# Patient Record
Sex: FEMALE | URBAN - METROPOLITAN AREA
[De-identification: names, ages, dates, MRNs, and addresses within clinical notes are randomized per-mention and may not be internally consistent; named-entity substitution may affect disease eponyms.]

---

## 2022-10-18 ENCOUNTER — APPOINTMENT (OUTPATIENT)
Dept: URBAN - METROPOLITAN AREA CLINIC 256 | Age: 27
Setting detail: DERMATOLOGY
End: 2022-10-20

## 2022-10-18 DIAGNOSIS — L40.0 PSORIASIS VULGARIS: ICD-10-CM

## 2022-10-18 DIAGNOSIS — L40.59 OTHER PSORIATIC ARTHROPATHY: ICD-10-CM

## 2022-10-18 DIAGNOSIS — D22 MELANOCYTIC NEVI: ICD-10-CM

## 2022-10-18 PROBLEM — D22.39 MELANOCYTIC NEVI OF OTHER PARTS OF FACE: Status: ACTIVE | Noted: 2022-10-18

## 2022-10-18 PROBLEM — D22.5 MELANOCYTIC NEVI OF TRUNK: Status: ACTIVE | Noted: 2022-10-18

## 2022-10-18 PROCEDURE — OTHER COUNSELING: OTHER

## 2022-10-18 PROCEDURE — 99204 OFFICE O/P NEW MOD 45 MIN: CPT | Mod: 25

## 2022-10-18 PROCEDURE — OTHER MIPS QUALITY: OTHER

## 2022-10-18 PROCEDURE — 11901 INJECT SKIN LESIONS >7: CPT

## 2022-10-18 PROCEDURE — OTHER INTRALESIONAL KENALOG: OTHER

## 2022-10-18 PROCEDURE — OTHER PRESCRIPTION: OTHER

## 2022-10-18 PROCEDURE — OTHER SEPARATE AND IDENTIFIABLE DOCUMENTATION: OTHER

## 2022-10-18 RX ORDER — KETOCONAZOLE 20 MG/ML
SHAMPOO, SUSPENSION TOPICAL
Qty: 120 | Refills: 5 | Status: ERX | COMMUNITY
Start: 2022-10-18

## 2022-10-18 RX ORDER — CLOBETASOL PROPIONATE 0.5 MG/ML
SOLUTION TOPICAL
Qty: 50 | Refills: 3 | Status: ERX | COMMUNITY
Start: 2022-10-18

## 2022-10-18 ASSESSMENT — LOCATION DETAILED DESCRIPTION DERM
LOCATION DETAILED: RIGHT SUPERIOR PARIETAL SCALP
LOCATION DETAILED: LEFT MEDIAL FRONTAL SCALP
LOCATION DETAILED: RIGHT THIRD PROXIMAL INTERPHALANGEAL JOINT
LOCATION DETAILED: LEFT SUPERIOR MEDIAL MIDBACK
LOCATION DETAILED: LEFT FOURTH PROXIMAL INTERPHALANGEAL JOINT
LOCATION DETAILED: RIGHT FOURTH PROXIMAL INTERPHALANGEAL JOINT
LOCATION DETAILED: RIGHT MEDIAL FRONTAL SCALP
LOCATION DETAILED: LEFT FIFTH PROXIMAL INTERPHALANGEAL JOINT
LOCATION DETAILED: MID-FRONTAL SCALP
LOCATION DETAILED: LEFT THIRD PROXIMAL INTERPHALANGEAL JOINT
LOCATION DETAILED: NASAL DORSUM
LOCATION DETAILED: RIGHT INFERIOR OCCIPITAL SCALP
LOCATION DETAILED: RIGHT FIFTH DISTAL INTERPHALANGEAL JOINT
LOCATION DETAILED: HAIR
LOCATION DETAILED: MID-OCCIPITAL SCALP
LOCATION DETAILED: LEFT SECOND PROXIMAL INTERPHALANGEAL JOINT
LOCATION DETAILED: RIGHT OCCIPITAL SCALP
LOCATION DETAILED: RIGHT SECOND PROXIMAL INTERPHALANGEAL JOINT

## 2022-10-18 ASSESSMENT — LOCATION SIMPLE DESCRIPTION DERM
LOCATION SIMPLE: LEFT SECOND PIP
LOCATION SIMPLE: RIGHT SECOND PIP
LOCATION SIMPLE: POSTERIOR SCALP
LOCATION SIMPLE: LEFT FIFTH PIP
LOCATION SIMPLE: RIGHT THIRD PIP
LOCATION SIMPLE: RIGHT FIFTH DIP
LOCATION SIMPLE: LEFT LOWER BACK
LOCATION SIMPLE: RIGHT FOURTH PIP
LOCATION SIMPLE: SCALP
LOCATION SIMPLE: LEFT THIRD PIP
LOCATION SIMPLE: LEFT FOURTH PIP
LOCATION SIMPLE: LEFT SCALP
LOCATION SIMPLE: NOSE
LOCATION SIMPLE: RIGHT SCALP
LOCATION SIMPLE: ANTERIOR SCALP
LOCATION SIMPLE: HAIR

## 2022-10-18 ASSESSMENT — LOCATION ZONE DERM
LOCATION ZONE: NOSE
LOCATION ZONE: TRUNK
LOCATION ZONE: SCALP
LOCATION ZONE: FINGER

## 2022-10-18 ASSESSMENT — BSA PSORIASIS: % BODY COVERED IN PSORIASIS: 5

## 2022-10-18 NOTE — PROCEDURE: INTRALESIONAL KENALOG
Medical Necessity Clause: This procedure was medically necessary because the lesions that were treated were:\\n\\nLeft superior parietal scalp\\nLesions Injected: 1\\nMedication Injected: 5.0 mg/cc of Kenalog\\nTotal Volume Injected: .02cc\\nLot Number: [id number]\\nExpiration Date: 02/2024\\nNDC #: 4654-8989-66\\nAdministered by: Yudelka Oropeza Medical Necessity Clause: This procedure was medically necessary because the lesions that were treated were:\\n\\nLeft superior parietal scalp\\nLesions Injected: 1\\nMedication Injected: 5.0 mg/cc of Kenalog\\nTotal Volume Injected: .02cc\\nLot Number: [id number]\\nExpiration Date: 02/2024\\nNDC #: 9241-4542-99\\nAdministered by: Yudelka Oropeza

## 2023-03-17 ENCOUNTER — HOSPITAL ENCOUNTER (EMERGENCY)
Facility: CLINIC | Age: 28
Discharge: HOME | End: 2023-03-17
Attending: EMERGENCY MEDICINE | Admitting: EMERGENCY MEDICINE
Payer: COMMERCIAL

## 2023-03-17 ENCOUNTER — APPOINTMENT (OUTPATIENT)
Dept: ULTRASOUND IMAGING | Facility: CLINIC | Age: 28
End: 2023-03-17
Attending: EMERGENCY MEDICINE
Payer: COMMERCIAL

## 2023-03-17 ENCOUNTER — APPOINTMENT (OUTPATIENT)
Dept: CT IMAGING | Facility: CLINIC | Age: 28
End: 2023-03-17
Attending: EMERGENCY MEDICINE
Payer: COMMERCIAL

## 2023-03-17 VITALS
RESPIRATION RATE: 18 BRPM | OXYGEN SATURATION: 98 % | HEART RATE: 85 BPM | WEIGHT: 130 LBS | SYSTOLIC BLOOD PRESSURE: 123 MMHG | DIASTOLIC BLOOD PRESSURE: 73 MMHG | HEIGHT: 60 IN | TEMPERATURE: 99.7 F | BODY MASS INDEX: 25.52 KG/M2

## 2023-03-17 DIAGNOSIS — N83.202 CYST OF LEFT OVARY: ICD-10-CM

## 2023-03-17 DIAGNOSIS — N73.0 PID (ACUTE PELVIC INFLAMMATORY DISEASE): ICD-10-CM

## 2023-03-17 DIAGNOSIS — R10.84 ABDOMINAL PAIN, GENERALIZED: ICD-10-CM

## 2023-03-17 LAB
ALBUMIN SERPL BCG-MCNC: 5.1 G/DL (ref 3.5–5.2)
ALBUMIN UR-MCNC: NEGATIVE MG/DL
ALP SERPL-CCNC: 54 U/L (ref 35–104)
ALT SERPL W P-5'-P-CCNC: 12 U/L (ref 10–35)
ANION GAP SERPL CALCULATED.3IONS-SCNC: 11 MMOL/L (ref 7–15)
APPEARANCE UR: CLEAR
AST SERPL W P-5'-P-CCNC: 16 U/L (ref 10–35)
BACTERIA #/AREA URNS HPF: ABNORMAL /HPF
BASOPHILS # BLD AUTO: 0.1 10E3/UL (ref 0–0.2)
BASOPHILS NFR BLD AUTO: 1 %
BILIRUB SERPL-MCNC: 0.5 MG/DL
BILIRUB UR QL STRIP: NEGATIVE
BUN SERPL-MCNC: 10.7 MG/DL (ref 6–20)
CALCIUM SERPL-MCNC: 9.9 MG/DL (ref 8.6–10)
CHLORIDE SERPL-SCNC: 103 MMOL/L (ref 98–107)
COLOR UR AUTO: ABNORMAL
CREAT SERPL-MCNC: 0.64 MG/DL (ref 0.51–0.95)
DEPRECATED HCO3 PLAS-SCNC: 24 MMOL/L (ref 22–29)
DEPRECATED S PYO AG THROAT QL EIA: NEGATIVE
EOSINOPHIL # BLD AUTO: 0 10E3/UL (ref 0–0.7)
EOSINOPHIL NFR BLD AUTO: 1 %
ERYTHROCYTE [DISTWIDTH] IN BLOOD BY AUTOMATED COUNT: 13.1 % (ref 10–15)
GFR SERPL CREATININE-BSD FRML MDRD: >90 ML/MIN/1.73M2
GLUCOSE SERPL-MCNC: 100 MG/DL (ref 70–99)
GLUCOSE UR STRIP-MCNC: NEGATIVE MG/DL
GROUP A STREP BY PCR: NOT DETECTED
HCG SERPL QL: NEGATIVE
HCT VFR BLD AUTO: 42.6 % (ref 35–47)
HGB BLD-MCNC: 14 G/DL (ref 11.7–15.7)
HGB UR QL STRIP: NEGATIVE
HOLD SPECIMEN: 1
HOLD SPECIMEN: NORMAL
IMM GRANULOCYTES # BLD: 0 10E3/UL
IMM GRANULOCYTES NFR BLD: 1 %
KETONES UR STRIP-MCNC: ABNORMAL MG/DL
LEUKOCYTE ESTERASE UR QL STRIP: ABNORMAL
LIPASE SERPL-CCNC: 25 U/L (ref 13–60)
LYMPHOCYTES # BLD AUTO: 1.9 10E3/UL (ref 0.8–5.3)
LYMPHOCYTES NFR BLD AUTO: 32 %
MCH RBC QN AUTO: 29.9 PG (ref 26.5–33)
MCHC RBC AUTO-ENTMCNC: 32.9 G/DL (ref 31.5–36.5)
MCV RBC AUTO: 91 FL (ref 78–100)
MONOCYTES # BLD AUTO: 0.4 10E3/UL (ref 0–1.3)
MONOCYTES NFR BLD AUTO: 6 %
MUCOUS THREADS #/AREA URNS LPF: PRESENT /LPF
NEUTROPHILS # BLD AUTO: 3.7 10E3/UL (ref 1.6–8.3)
NEUTROPHILS NFR BLD AUTO: 59 %
NITRATE UR QL: NEGATIVE
NRBC # BLD AUTO: 0 10E3/UL
NRBC BLD AUTO-RTO: 0 /100
PH UR STRIP: 6 [PH] (ref 5–7)
PLATELET # BLD AUTO: 303 10E3/UL (ref 150–450)
POTASSIUM SERPL-SCNC: 4.3 MMOL/L (ref 3.4–5.3)
PROT SERPL-MCNC: 8 G/DL (ref 6.4–8.3)
RBC # BLD AUTO: 4.69 10E6/UL (ref 3.8–5.2)
RBC URINE: 3 /HPF
SODIUM SERPL-SCNC: 138 MMOL/L (ref 136–145)
SP GR UR STRIP: 1.02 (ref 1–1.03)
SQUAMOUS EPITHELIAL: 9 /HPF
UROBILINOGEN UR STRIP-MCNC: NORMAL MG/DL
WBC # BLD AUTO: 6.1 10E3/UL (ref 4–11)
WBC URINE: 2 /HPF

## 2023-03-17 PROCEDURE — 85004 AUTOMATED DIFF WBC COUNT: CPT | Performed by: EMERGENCY MEDICINE

## 2023-03-17 PROCEDURE — 84703 CHORIONIC GONADOTROPIN ASSAY: CPT | Performed by: EMERGENCY MEDICINE

## 2023-03-17 PROCEDURE — 76830 TRANSVAGINAL US NON-OB: CPT

## 2023-03-17 PROCEDURE — 250N000011 HC RX IP 250 OP 636: Performed by: EMERGENCY MEDICINE

## 2023-03-17 PROCEDURE — 80053 COMPREHEN METABOLIC PANEL: CPT | Performed by: EMERGENCY MEDICINE

## 2023-03-17 PROCEDURE — 258N000003 HC RX IP 258 OP 636: Performed by: EMERGENCY MEDICINE

## 2023-03-17 PROCEDURE — 96376 TX/PRO/DX INJ SAME DRUG ADON: CPT

## 2023-03-17 PROCEDURE — 96375 TX/PRO/DX INJ NEW DRUG ADDON: CPT

## 2023-03-17 PROCEDURE — 83690 ASSAY OF LIPASE: CPT | Performed by: EMERGENCY MEDICINE

## 2023-03-17 PROCEDURE — 96374 THER/PROPH/DIAG INJ IV PUSH: CPT

## 2023-03-17 PROCEDURE — 96372 THER/PROPH/DIAG INJ SC/IM: CPT | Mod: 59 | Performed by: EMERGENCY MEDICINE

## 2023-03-17 PROCEDURE — 87651 STREP A DNA AMP PROBE: CPT | Performed by: EMERGENCY MEDICINE

## 2023-03-17 PROCEDURE — 74177 CT ABD & PELVIS W/CONTRAST: CPT

## 2023-03-17 PROCEDURE — 250N000009 HC RX 250: Performed by: EMERGENCY MEDICINE

## 2023-03-17 PROCEDURE — 36415 COLL VENOUS BLD VENIPUNCTURE: CPT | Performed by: EMERGENCY MEDICINE

## 2023-03-17 PROCEDURE — 81001 URINALYSIS AUTO W/SCOPE: CPT | Performed by: EMERGENCY MEDICINE

## 2023-03-17 PROCEDURE — 96361 HYDRATE IV INFUSION ADD-ON: CPT

## 2023-03-17 PROCEDURE — 99285 EMERGENCY DEPT VISIT HI MDM: CPT | Mod: 25

## 2023-03-17 RX ORDER — AZITHROMYCIN 1 G/1
1 POWDER, FOR SUSPENSION ORAL ONCE
Qty: 1 EACH | Refills: 0 | Status: SHIPPED | OUTPATIENT
Start: 2023-03-17 | End: 2023-03-17

## 2023-03-17 RX ORDER — SODIUM CHLORIDE 9 MG/ML
INJECTION, SOLUTION INTRAVENOUS CONTINUOUS
Status: DISCONTINUED | OUTPATIENT
Start: 2023-03-17 | End: 2023-03-17 | Stop reason: HOSPADM

## 2023-03-17 RX ORDER — METRONIDAZOLE 500 MG/1
500 TABLET ORAL 2 TIMES DAILY
Qty: 14 TABLET | Refills: 1 | Status: SHIPPED | OUTPATIENT
Start: 2023-03-17 | End: 2023-03-31

## 2023-03-17 RX ORDER — ONDANSETRON 2 MG/ML
4 INJECTION INTRAMUSCULAR; INTRAVENOUS EVERY 30 MIN PRN
Status: DISCONTINUED | OUTPATIENT
Start: 2023-03-17 | End: 2023-03-17 | Stop reason: HOSPADM

## 2023-03-17 RX ORDER — KETOROLAC TROMETHAMINE 15 MG/ML
15 INJECTION, SOLUTION INTRAMUSCULAR; INTRAVENOUS ONCE
Status: COMPLETED | OUTPATIENT
Start: 2023-03-17 | End: 2023-03-17

## 2023-03-17 RX ORDER — ONDANSETRON 2 MG/ML
4 INJECTION INTRAMUSCULAR; INTRAVENOUS ONCE
Status: COMPLETED | OUTPATIENT
Start: 2023-03-17 | End: 2023-03-17

## 2023-03-17 RX ORDER — IOPAMIDOL 755 MG/ML
65 INJECTION, SOLUTION INTRAVASCULAR ONCE
Status: COMPLETED | OUTPATIENT
Start: 2023-03-17 | End: 2023-03-17

## 2023-03-17 RX ADMIN — LIDOCAINE HYDROCHLORIDE 500 MG: 10 INJECTION, SOLUTION EPIDURAL; INFILTRATION; INTRACAUDAL; PERINEURAL at 18:35

## 2023-03-17 RX ADMIN — KETOROLAC TROMETHAMINE 15 MG: 15 INJECTION, SOLUTION INTRAMUSCULAR; INTRAVENOUS at 14:17

## 2023-03-17 RX ADMIN — ONDANSETRON 4 MG: 2 INJECTION INTRAMUSCULAR; INTRAVENOUS at 13:52

## 2023-03-17 RX ADMIN — SODIUM CHLORIDE 60 ML: 9 INJECTION, SOLUTION INTRAVENOUS at 14:21

## 2023-03-17 RX ADMIN — IOPAMIDOL 65 ML: 755 INJECTION, SOLUTION INTRAVENOUS at 14:21

## 2023-03-17 RX ADMIN — ONDANSETRON 4 MG: 2 INJECTION INTRAMUSCULAR; INTRAVENOUS at 15:56

## 2023-03-17 RX ADMIN — SODIUM CHLORIDE 1000 ML: 9 INJECTION, SOLUTION INTRAVENOUS at 14:18

## 2023-03-17 ASSESSMENT — ACTIVITIES OF DAILY LIVING (ADL)
ADLS_ACUITY_SCORE: 35
ADLS_ACUITY_SCORE: 35

## 2023-03-17 ASSESSMENT — ENCOUNTER SYMPTOMS
DIARRHEA: 1
FEVER: 1
VOMITING: 1
NAUSEA: 1
ABDOMINAL PAIN: 1

## 2023-03-17 NOTE — ED NOTES
"Patient on call light, stating again aggressively to staff, \"that she is going to leave its been her 15 minutes, and has to leave now\" Staff told the patient that she had talked with the doctor when she was saying this prior and then decided to stay. Staff asked if she would like to talk with the doctor before she leaves and patient stated, \"if he wants to send me a prescription he can\", patient then stated, \"I want you to be out of my face\". Staff stated \"okay, I can leave\". RN notified. Patient coming out of her room talking with everyone around that she is leaving.   "

## 2023-03-17 NOTE — ED NOTES
States needs to leave, feels like her pain isn't being addressed because of her methadone hx, told pt MD will be notified and talk to her about results

## 2023-03-17 NOTE — ED TRIAGE NOTES
Abdominal pain that started Wednesday night. Pain is periumbilical. Was told by ObGyn on Wednesday that she has an umbilical hernia. Today reports nausea and a fever at home of 101. Temp came down to 99 with tylenol. Also some pain into the lower back. Notes cramping into the lower abdomen     Triage Assessment     Row Name 03/17/23 8102       Triage Assessment (Adult)    Airway WDL WDL       Respiratory WDL    Respiratory WDL WDL       Skin Circulation/Temperature WDL    Skin Circulation/Temperature WDL WDL       Cardiac WDL    Cardiac WDL WDL       Peripheral/Neurovascular WDL    Peripheral Neurovascular WDL WDL       Cognitive/Neuro/Behavioral WDL    Cognitive/Neuro/Behavioral WDL WDL

## 2023-03-17 NOTE — ED PROVIDER NOTES
History     Chief Complaint:  Abdominal Pain       The history is provided by the patient.      Sharon Barrera is a 28 year old female with a history of PID who presents with low abdominal pain. She reports about 3-4 months of intermittent cramping in her lower abdomen that radiates to her back. She went to see her ob/gyn a while ago, with pelvic exam and no problem. She went again 2 days ago and she was told she had a hernia. Her ob/gyn saw yellow discharge, which he thought was yeast but tests came back negative. The patient is sexually active, her recent STD tests came back negative. She reports a negative home pregnancy test. She notes history of PID, but her ob/gyn did not think this was the case. The patient reports that it seems like past PID since her cramping radiates to her back. She did not know she had a hernia, but it is now protruding after her recent appointment and there is pain in this area. Two nights ago she started to develop nausea, vomiting, and fever. This has persisted and is concerning to her today. She notes diarrhea as well. The patient mentions UTI a couple months ago, but this resolved with antibiotics. She is currently taking no medications. She had a past appendectomy, no other surgeries.     Independent Historian:   None - Patient Only    Review of External Notes: Urgent care note from today, recent ob/gyn office visit      ROS:  Review of Systems   Constitutional: Positive for fever.   Gastrointestinal: Positive for abdominal pain, diarrhea, nausea and vomiting.   Genitourinary: Positive for pelvic pain.   All other systems reviewed and are negative.      Allergies:  Doxycycline  Sertraline  Wellbutrin    Medications:    The patient is currently on no regular medications.     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  Recent ob/gyn appointment, has ultrasound scheduled next week.    PCP: Kenroy Diallo     Physical Exam     Patient Vitals for the past 24 hrs:   BP Temp Temp src Pulse Resp SpO2 Height Weight   03/17/23 1337 123/73 99.7  F (37.6  C) Temporal 85 18 98 % 1.524 m (5') 59 kg (130 lb)        Physical Exam  GENERAL: well developed, pleasant  HEAD: atraumatic  EYES: pupils reactive, extraocular muscles intact, conjunctivae normal  ENT:  mucus membranes moist  NECK:  trachea midline, normal range of motion  RESPIRATORY: no tachypnea, breath sounds clear to auscultation   CVS: normal S1/S2, no murmurs, intact distal pulses  ABDOMEN: soft, lower abdominal tenderness   MUSCULOSKELETAL: no deformities  SKIN: warm and dry, no acute rashes or ulceration  NEURO: GCS 15, cranial nerves intact, alert and oriented x3  PSYCH:  Mood/affect normal       Emergency Department Course     Imaging:  US Pelvis Cmplt w Transvag & Doppler LmtPel Duplex Limited   Preliminary Result   IMPRESSION:     1. There is a complex cystic mass seen in the left ovary measuring 3.1 x 3.0 x 2.7 cm with numerous areas of thickened irregular septations. No internal hypervascularity seen on color Doppler imaging. This may represent sequelae of a prior hemorrhagic    cyst. Conservative follow-up ultrasound in approximately 2 months for reevaluation is recommended. Otherwise the left ovary is normal with arterial and venous duplex flow identified.   2. Minimal free fluid in the pelvis.   3. The pelvis is otherwise normal.               CT Abdomen Pelvis w Contrast   Final Result   IMPRESSION:    1.  Left ovarian cystic lesion is noted measuring 3.5 cm. Small free   pelvic fluid.   2.  No other acute abnormality is seen.      DEBORAH RANDALL MD            SYSTEM ID:  [id number]      Report per radiology    Laboratory:  Labs Ordered and Resulted from Time of ED Arrival to Time of ED Departure   COMPREHENSIVE METABOLIC PANEL - Abnormal       Result Value    Sodium  138      Potassium 4.3      Chloride 103      Carbon Dioxide (CO2) 24      Anion Gap 11      Urea Nitrogen 10.7      Creatinine 0.64      Calcium 9.9      Glucose 100 (*)     Alkaline Phosphatase 54      AST 16      ALT 12      Protein Total 8.0      Albumin 5.1      Bilirubin Total 0.5      GFR Estimate >90     UA MACROSCOPIC WITH REFLEX TO MICRO AND CULTURE - Abnormal    Color Urine Light Yellow      Appearance Urine Clear      Glucose Urine Negative      Bilirubin Urine Negative      Ketones Urine Trace (*)     Specific Gravity Urine 1.020      Blood Urine Negative      pH Urine 6.0      Protein Albumin Urine Negative      Urobilinogen Urine Normal      Nitrite Urine Negative      Leukocyte Esterase Urine Trace (*)     Bacteria Urine Few (*)     Mucus Urine Present (*)     RBC Urine 3 (*)     WBC Urine 2      Squamous Epithelials Urine 9 (*)    LIPASE - Normal    Lipase 25     HCG QUALITATIVE PREGNANCY - Normal    hCG Serum Qualitative Negative     STREPTOCOCCUS A RAPID SCREEN W REFELX TO PCR - Normal    Group A Strep antigen Negative     CBC WITH PLATELETS AND DIFFERENTIAL    WBC Count 6.1      RBC Count 4.69      Hemoglobin 14.0      Hematocrit 42.6      MCV 91      MCH 29.9      MCHC 32.9      RDW 13.1      Platelet Count 303      % Neutrophils 59      % Lymphocytes 32      % Monocytes 6      % Eosinophils 1      % Basophils 1      % Immature Granulocytes 1      NRBCs per 100 WBC 0      Absolute Neutrophils 3.7      Absolute Lymphocytes 1.9      Absolute Monocytes 0.4      Absolute Eosinophils 0.0      Absolute Basophils 0.1      Absolute Immature Granulocytes 0.0      Absolute NRBCs 0.0     GROUP A STREPTOCOCCUS PCR THROAT SWAB        Emergency Department Course & Assessments:       Interventions:  Medications   0.9% sodium chloride BOLUS (0 mLs Intravenous Stopped 3/17/23 4788)     Followed by   sodium chloride 0.9% infusion (has no administration in time range)   ondansetron (ZOFRAN) injection 4 mg (4 mg  Intravenous $Given 3/17/23 1556)   ondansetron (ZOFRAN) injection 4 mg (4 mg Intravenous $Given 3/17/23 1352)   ketorolac (TORADOL) injection 15 mg (15 mg Intravenous $Given 3/17/23 1417)   iopamidol (ISOVUE-370) solution 65 mL (65 mLs Intravenous $Given 3/17/23 1421)   100mL Saline Flush (60 mLs Intravenous $Given 3/17/23 1421)   cefTRIAXone (ROCEPHIN) 500 mg in lidocaine injection (500 mg Intramuscular $Given 3/17/23 1835)        Assessments:  1401 I obtained history and examined the patient as noted above.   1710 I rechecked the patient and explained findings.   1737 I rechecked the patient after being informed she would like to go home. She agreed to stay for the ultrasound.   1845 I updated the patient. At this point I feel that the patient is safe for discharge, and the patient agrees.     Independent Interpretation (X-rays, CTs, rhythm strip):  na    Consultations/Discussion of Management or Tests:  None     Social Determinants of Health affecting care:   None    Disposition:  The patient was discharged to home.     Impression & Plan      Medical Decision Making:  Patient presents with lower abdominal pain that is been ongoing for some time.  She notes seeing her OB and doing a pelvic exam and noting an angry cervix with some discharge but her STD testing was normal.  She does have a history of PID.  She has pain in the lower belly that goes into her back.  She also has reports of fever and concerns for bowel obstruction or hernia complications as she was told that she had a hernia the other day.  Patient's CT shows cystic lesion in the left ovary otherwise no other acute findings.  Other labs are reassuring.  She has been anxious to leave.  Did finally convince her to get an ultrasound that shows complex cystic lesion but no suggestion of tubo-ovarian abscess.  Again she is quite anxious to leave she was given Rocephin given her history of PID and this reports of fever without findings on exam.  Have not  repeated the pelvic exam as it was just recently done.  We will go ahead and treat her empirically and have her follow-up otherwise return if worse.      Diagnosis:    ICD-10-CM    1. Abdominal pain, generalized  R10.84       2. Cyst of left ovary  N83.202       3. PID (acute pelvic inflammatory disease)  N73.0            Discharge Medications:  New Prescriptions    AZITHROMYCIN (ZITHROMAX) 1 G POWDER    Take 1 packet (1 g) by mouth once for 1 dose    METRONIDAZOLE (FLAGYL) 500 MG TABLET    Take 1 tablet (500 mg) by mouth 2 times daily for 14 days        Scribe Disclosure:  I, Gloria Richmond, am serving as a scribe at 2:00 PM on 3/17/2023 to document services personally performed by Calvin Newsome MD based on my observations and the provider's statements to me.     3/17/2023   Calvin Newsome MD Adams, Shaun L, MD  03/17/23 2046

## 2023-10-09 ENCOUNTER — APPOINTMENT (OUTPATIENT)
Dept: URBAN - METROPOLITAN AREA CLINIC 259 | Age: 28
Setting detail: DERMATOLOGY
End: 2023-10-11

## 2023-10-09 DIAGNOSIS — D49.2 NEOPLASM OF UNSPECIFIED BEHAVIOR OF BONE, SOFT TISSUE, AND SKIN: ICD-10-CM

## 2023-10-09 DIAGNOSIS — L40.0 PSORIASIS VULGARIS: ICD-10-CM

## 2023-10-09 PROCEDURE — 11102 TANGNTL BX SKIN SINGLE LES: CPT

## 2023-10-09 PROCEDURE — OTHER MIPS QUALITY: OTHER

## 2023-10-09 PROCEDURE — 99214 OFFICE O/P EST MOD 30 MIN: CPT | Mod: 25

## 2023-10-09 PROCEDURE — OTHER BIOPSY BY SHAVE METHOD: OTHER

## 2023-10-09 PROCEDURE — OTHER COUNSELING: OTHER

## 2023-10-09 PROCEDURE — 11103 TANGNTL BX SKIN EA SEP/ADDL: CPT

## 2023-10-09 PROCEDURE — OTHER PRESCRIPTION: OTHER

## 2023-10-09 RX ORDER — CLOBETASOL PROPIONATE 0.5 MG/ML
SOLUTION TOPICAL
Qty: 50 | Refills: 3 | Status: ERX

## 2023-10-09 RX ORDER — KETOCONAZOLE 20 MG/ML
SHAMPOO, SUSPENSION TOPICAL
Qty: 120 | Refills: 5 | Status: ERX

## 2023-10-09 ASSESSMENT — LOCATION SIMPLE DESCRIPTION DERM
LOCATION SIMPLE: POSTERIOR SCALP
LOCATION SIMPLE: NOSE
LOCATION SIMPLE: LEFT ANTERIOR NECK

## 2023-10-09 ASSESSMENT — LOCATION ZONE DERM
LOCATION ZONE: SCALP
LOCATION ZONE: NECK
LOCATION ZONE: NOSE

## 2023-10-09 ASSESSMENT — LOCATION DETAILED DESCRIPTION DERM
LOCATION DETAILED: LEFT SUPERIOR ANTERIOR NECK
LOCATION DETAILED: NASAL DORSUM
LOCATION DETAILED: RIGHT OCCIPITAL SCALP

## 2023-10-09 NOTE — HPI: SKIN LESION
What Type Of Note Output Would You Prefer (Optional)?: Standard Output
How Severe Is Your Skin Lesion?: mild
Is This A New Presentation, Or A Follow-Up?: Skin Lesion
Additional History: The patient reports a mole on her nose started growing a few years ago and then became painful and tender in the last few weeks. She states that the mole scabbed over a few days ago also began bleeding today. She reports a family history of melanoma. The patient reports that at age 20, she had a mole removed from her lip which was benign.\\n\\nOf note, she had a background history of psoriasis.

## 2023-10-09 NOTE — PROCEDURE: COUNSELING
Detail Level: Detailed
Patient Specific Counseling (Will Not Stick From Patient To Patient): ***We discussed that scalp is a difficult to treat area that could qualify for a biologic medication.  For now, we will continue Ketoconazole shampoo and topical steroid. She is planning to see a Rheumatologist.
Detail Level: Zone

## 2023-10-23 ENCOUNTER — TRANSFERRED RECORDS (OUTPATIENT)
Dept: HEALTH INFORMATION MANAGEMENT | Facility: CLINIC | Age: 28
End: 2023-10-23
Payer: COMMERCIAL

## 2023-11-15 NOTE — TELEPHONE ENCOUNTER
RECORDS RECEIVED FROM:    DATE RECEIVED:    NOTES STATUS DETAILS   OFFICE NOTE from referring provider  N/A    OFFICE NOTE from other cardiologists  Care Everywhere Dr. Guevara 11-14-23   RECORDS from hospital/ED Care Everywhere 10-23-23   MEDICATION LIST Internal    GENERAL CARDIO RECORDS   (ALL APPOINTMENT TYPES)     LABS (CBC,BMP,CMP, TSH) Internal    EKG (STRIPS & REPORTS) Care Everywhere 11-14-23 in [facility]   MONITORS (STRIPS & REPORTS) N/A    ECHOS (IMAGES AND REPORTS) In process 10-24-23 Requested   STRESS TESTS (IMAGES AND REPORTS) N/A    cMRI (IMAGES AND REPORTS) In process 10-25-23 Requested   CT/CTA (IMAGES AND REPORTS) In process 10-24-23 Requested     Action 11/15/23 [facility]  Fax #[phone]   Action Taken Requested:  EKG Strips  cMRI  CT Cardiac coronary  Echo  CT Chest PE 10-25-23, 10-24-23  10-25-23  10-25-23  10-24-23  10-24-23     Resolved all 4 images in PACS, sent EKGs to scanning 11/20

## 2023-11-17 ENCOUNTER — NURSE TRIAGE (OUTPATIENT)
Dept: CARDIOLOGY | Facility: CLINIC | Age: 28
End: 2023-11-17
Payer: COMMERCIAL

## 2023-11-17 NOTE — TELEPHONE ENCOUNTER
"Received a call from the call center who says patient has anew patient appointment, but wanted direction for when to go to ED.  Patient has an appointment 11/29/23 with Dr. Cash to establish care.  Spoke to Sharon, who says she was in the hospital recently and was seen by Shayla Guevara NP on 11/14/23 at the [facility] for follow up of chest pain and shortness of breath. She says the last 3 days, her symptoms seem to be intermittently worse, and is asking for recommendations.  Advised that she continue to follow up with her [facility] cardiology team for any questions or concerns until established with [facility] cardiology. She says she has and they \"don't know anything\" and tell her to go back to the hospital.  Advised if symptoms are worsening, it is recommended to go to ED for an evaluation.  She verbalized agreement and understanding.  "

## 2023-11-29 ENCOUNTER — HOSPITAL ENCOUNTER (OUTPATIENT)
Facility: CLINIC | Age: 28
End: 2023-11-29
Attending: INTERNAL MEDICINE | Admitting: INTERNAL MEDICINE
Payer: COMMERCIAL

## 2023-11-29 ENCOUNTER — OFFICE VISIT (OUTPATIENT)
Dept: CARDIOLOGY | Facility: CLINIC | Age: 28
End: 2023-11-29
Payer: COMMERCIAL

## 2023-11-29 VITALS
DIASTOLIC BLOOD PRESSURE: 81 MMHG | HEIGHT: 62 IN | HEART RATE: 81 BPM | OXYGEN SATURATION: 98 % | WEIGHT: 132.9 LBS | SYSTOLIC BLOOD PRESSURE: 119 MMHG | BODY MASS INDEX: 24.46 KG/M2

## 2023-11-29 DIAGNOSIS — R06.09 DYSPNEA ON EXERTION: Primary | ICD-10-CM

## 2023-11-29 DIAGNOSIS — R00.2 PALPITATIONS: ICD-10-CM

## 2023-11-29 DIAGNOSIS — R55 VASOVAGAL SYNCOPE: ICD-10-CM

## 2023-11-29 DIAGNOSIS — I49.1 PAC (PREMATURE ATRIAL CONTRACTION): ICD-10-CM

## 2023-11-29 DIAGNOSIS — R07.89 OTHER CHEST PAIN: ICD-10-CM

## 2023-11-29 DIAGNOSIS — I49.3 PVC'S (PREMATURE VENTRICULAR CONTRACTIONS): ICD-10-CM

## 2023-11-29 DIAGNOSIS — R06.09 DYSPNEA ON EXERTION: ICD-10-CM

## 2023-11-29 PROCEDURE — G0463 HOSPITAL OUTPT CLINIC VISIT: HCPCS | Performed by: INTERNAL MEDICINE

## 2023-11-29 PROCEDURE — 99204 OFFICE O/P NEW MOD 45 MIN: CPT | Performed by: INTERNAL MEDICINE

## 2023-11-29 RX ORDER — KETOCONAZOLE 20 MG/ML
SHAMPOO TOPICAL
COMMUNITY

## 2023-11-29 RX ORDER — METOPROLOL TARTRATE 25 MG/1
12.5 TABLET, FILM COATED ORAL
COMMUNITY
Start: 2023-10-25

## 2023-11-29 RX ORDER — LIDOCAINE 40 MG/G
CREAM TOPICAL
OUTPATIENT
Start: 2023-11-29

## 2023-11-29 RX ORDER — CLOBETASOL PROPIONATE 0.5 MG/ML
SOLUTION TOPICAL
COMMUNITY

## 2023-11-29 RX ORDER — IBUPROFEN 800 MG/1
TABLET, FILM COATED ORAL
COMMUNITY
Start: 2023-08-24

## 2023-11-29 ASSESSMENT — PAIN SCALES - GENERAL: PAINLEVEL: EXTREME PAIN (8)

## 2023-11-29 NOTE — PROGRESS NOTES
I am delighted to see Sharon Barrera in consultation.The primary encounter diagnosis was Vasovagal syncope. Diagnoses of Other chest pain, Palpitations, Dyspnea on exertion, and PVC's (premature ventricular contractions) were also pertinent to this visit.   As you know, the patient is a 28 year old  female. She   has a past medical history of Anxiety, Depressive disorder, Nondependent opioid abuse, continuous (H) (08/08/2016), Palpitations, Papanicolaou smear of cervix with low grade squamous intraepithelial lesion (LGSIL) (12/14/2016), PTSD (post-traumatic stress disorder), Shortness of breath, and Syncope..    On this visit, the patient states that she has continuous chest tightness, dyspnea, worsening chest pain when sitting compared to lying, palpitations.  The patient denies orthopnea and paroxysmal nocturnal dyspnea. She has intermittent bilat LE edema. She had syncope in the hospital related to pain.    The patient's cardiovascular risk factors include arrhythmia.    The following portions of the patient's history were reviewed and updated as appropriate: allergies, current medications, past family history, past medical history, past social history, past surgical history, and the problem list.    PMH: The patient's past medical history includes:    Past Medical History:   Diagnosis Date    Anxiety     Depressive disorder     post partum    Nondependent opioid abuse, continuous (H) 08/08/2016    Palpitations     Papanicolaou smear of cervix with low grade squamous intraepithelial lesion (LGSIL) 12/14/2016    PTSD (post-traumatic stress disorder)     [place] survivor    Shortness of breath     Syncope       Past Surgical History:   Procedure Laterality Date    APPENDECTOMY         The patient's medications as of the current encounter are:     Current Outpatient Medications   Medication Sig Dispense Refill    clobetasol (TEMOVATE) 0.05 % external solution Apply topically every 48 hours       ibuprofen (ADVIL/MOTRIN) 800 MG tablet take 1 tablet by mouth every 6 to 8 hours as needed for pain      ketoconazole (NIZORAL) 2 % external shampoo Apply topically every 48 hours      metoprolol tartrate (LOPRESSOR) 25 MG tablet Take 12.5 mg by mouth         Labs:     Admission on 03/17/2023, Discharged on 03/17/2023   Component Date Value Ref Range Status    Sodium 03/17/2023 138  136 - 145 mmol/L Final    Potassium 03/17/2023 4.3  3.4 - 5.3 mmol/L Final    Chloride 03/17/2023 103  98 - 107 mmol/L Final    Carbon Dioxide (CO2) 03/17/2023 24  22 - 29 mmol/L Final    Anion Gap 03/17/2023 11  7 - 15 mmol/L Final    Urea Nitrogen 03/17/2023 10.7  6.0 - 20.0 mg/dL Final    Creatinine 03/17/2023 0.64  0.51 - 0.95 mg/dL Final    Calcium 03/17/2023 9.9  8.6 - 10.0 mg/dL Final    Glucose 03/17/2023 100 (H)  70 - 99 mg/dL Final    Alkaline Phosphatase 03/17/2023 54  35 - 104 U/L Final    AST 03/17/2023 16  10 - 35 U/L Final    ALT 03/17/2023 12  10 - 35 U/L Final    Protein Total 03/17/2023 8.0  6.4 - 8.3 g/dL Final    Albumin 03/17/2023 5.1  3.5 - 5.2 g/dL Final    Bilirubin Total 03/17/2023 0.5  <=1.2 mg/dL Final    GFR Estimate 03/17/2023 >90  >60 mL/min/1.73m2 Final    eGFR calculated using 2021 CKD-EPI equation.    Lipase 03/17/2023 25  13 - 60 U/L Final    hCG Serum Qualitative 03/17/2023 Negative  Negative Final    This test is for screening purposes.  Results should be interpreted along with the clinical picture.  Confirmation testing is available if warranted by ordering [id number], HCG Quantitative Pregnancy.    WBC Count 03/17/2023 6.1  4.0 - 11.0 10e3/uL Final    RBC Count 03/17/2023 4.69  3.80 - 5.20 10e6/uL Final    Hemoglobin 03/17/2023 14.0  11.7 - 15.7 g/dL Final    Hematocrit 03/17/2023 42.6  35.0 - 47.0 % Final    MCV 03/17/2023 91  78 - 100 fL Final    MCH 03/17/2023 29.9  26.5 - 33.0 pg Final    MCHC 03/17/2023 32.9  31.5 - 36.5 g/dL Final    RDW 03/17/2023 13.1  10.0 - 15.0 % Final    Platelet Count  03/17/2023 303  150 - 450 10e3/uL Final    % Neutrophils 03/17/2023 59  % Final    % Lymphocytes 03/17/2023 32  % Final    % Monocytes 03/17/2023 6  % Final    % Eosinophils 03/17/2023 1  % Final    % Basophils 03/17/2023 1  % Final    % Immature Granulocytes 03/17/2023 1  % Final    NRBCs per 100 WBC 03/17/2023 0  <1 /100 Final    Absolute Neutrophils 03/17/2023 3.7  1.6 - 8.3 10e3/uL Final    Absolute Lymphocytes 03/17/2023 1.9  0.8 - 5.3 10e3/uL Final    Absolute Monocytes 03/17/2023 0.4  0.0 - 1.3 10e3/uL Final    Absolute Eosinophils 03/17/2023 0.0  0.0 - 0.7 10e3/uL Final    Absolute Basophils 03/17/2023 0.1  0.0 - 0.2 10e3/uL Final    Absolute Immature Granulocytes 03/17/2023 0.0  <=0.4 10e3/uL Final    Absolute NRBCs 03/17/2023 0.0  10e3/uL Final    Hold Specimen 03/17/2023 [facility]   Final    Hold Specimen 03/17/2023 1   Final    Color Urine 03/17/2023 Light Yellow  Colorless, Straw, Light Yellow, Yellow Final    Appearance Urine 03/17/2023 Clear  Clear Final    Glucose Urine 03/17/2023 Negative  Negative mg/dL Final    Bilirubin Urine 03/17/2023 Negative  Negative Final    Ketones Urine 03/17/2023 Trace (A)  Negative mg/dL Final    Specific Gravity Urine 03/17/2023 1.020  1.003 - 1.035 Final    Blood Urine 03/17/2023 Negative  Negative Final    pH Urine 03/17/2023 6.0  5.0 - 7.0 Final    Protein Albumin Urine 03/17/2023 Negative  Negative mg/dL Final    Urobilinogen Urine 03/17/2023 Normal  Normal, 2.0 mg/dL Final    Nitrite Urine 03/17/2023 Negative  Negative Final    Leukocyte Esterase Urine 03/17/2023 Trace (A)  Negative Final    Bacteria Urine 03/17/2023 Few (A)  None Seen /HPF Final    Mucus Urine 03/17/2023 Present (A)  None Seen /LPF Final    RBC Urine 03/17/2023 3 (H)  <=2 /HPF Final    WBC Urine 03/17/2023 2  <=5 /HPF Final    Squamous Epithelials Urine 03/17/2023 9 (H)  <=1 /HPF Final    Group A Strep antigen 03/17/2023 Negative  Negative Final    Group A strep by PCR 03/17/2023 Not Detected  Not  Detected Final       Allergies:    Allergies   Allergen Reactions    Doxycycline Unknown    Sertraline Other (See Comments)     Suicidal ideation    Wellbutrin [Bupropion] Other (See Comments)     seizure       Family History:   Family History   Problem Relation Age of Onset    No Known Problems Mother     No Known Problems Father     Panhypopituitarism Brother     No Known Problems Son     No Known Problems Son     Glaucoma No family hx of     Macular Degeneration No family hx of        Psychosocial history:  reports that she has been smoking cigarettes. She has a 17.00 pack-year smoking history. She has been exposed to tobacco smoke. She does not have any smokeless tobacco history on file. She reports current alcohol use. She reports that she does not use drugs.    Review of systems: positive for chest pain, dyspnea on exertion, and lower extremity edema    In addition,   General: No change in weight, sleep or appetite.  Normal energy.  No fever or chills  Eyes: Negative for vision changes or eye problems  ENT: No problems with ears, nose or throat.  No difficulty swallowing.  Resp: No coughing, wheezing or shortness of breath  GI: No nausea, vomiting,  heartburn, abdominal pain, diarrhea, constipation or change in bowel habits  : No urinary frequency or dysuria, bladder or kidney problems  Musculoskeletal: No significant muscle or joint pains  Neurologic: No headaches, numbness, tingling, weakness, problems with balance or coordination  Psychiatric: Anxiety  Heme/immune/allergy: No history of bleeding or clotting problems or anemia.  Endocrine: No history of thyroid disease, diabetes or other endocrine disorders  Skin: No rashes,worrisome lesions or skin problems  Vascular:  No claudication, lifestyle limiting or otherwise; no ischemic rest pain; no non-healing ulcers. No weakness, No loss of sensation        Physical examination  Vitals: /81 (BP Location: Right arm, Patient Position: Sitting, Cuff Size:  "Adult Regular)   Pulse 81   Ht 1.57 m (5' 1.81\")   Wt 60.3 kg (132 lb 14.4 oz)   LMP 11/14/2023 (Exact Date)   SpO2 98%   BMI 24.46 kg/m    BMI= Body mass index is 24.46 kg/m .    In general, the patient is a pleasant female in no apparent distress.    HEENT: Normiocephalic and atraumatic.  PERRLA.  EOMI.  Sclerae white, not injected.  Nares clear.  Pharynx without erythema or exudate.  Dentition intact.    Neck: No adenopathy.  No thyromegaly. Carotids +2/2 bilaterally without bruits.  No jugular venous distension.   Heart:  The PMI is in the 5th ICS in the midclavicular line. There is no heave. Regular rate and rhythm. Normal S1, S2 splits physiologically. No murmur, rub, click, or gallop.    Lungs: Clear to asculation.  No ronchi, wheezes, rales.  No dullness to percussion.   Abdomen: Soft, nontender, nondistended. No organomegaly. No AAA.  No bruits.   Extremities: No clubbing, cyanosis, or edema. The pulses were intact bilaterally.   Neurological: The neurological examination reveal a patient who was oriented to person, place, and time.  The remainder of the examination was nonfocal.    Cardiac tests include:    11/17/23 - zio - freq PACs, occ PVCs  11/14/23 - ECG - NSR, PAC with aberrant conduction, NST  10/25/23 - CMR - normal LVEF, no scarring, infiltration  10/25/23 - CTA - no epicardial CAD  10/24/23 - echo - EF normal, no RWMA  10/24/23 - chest CT - no PE, normal lung parenchyma    Assessment and Plan    Dyspnea/chest pain - check PFTs, right heart cath  - pulmonary referral  2. Atypical CP - unlikely cardiac  3. Palpitations - freq PACs, occ PVCs  - on beta blocker  - no significant arrhythmias  - no FH of SCD  4. Syncope - likely vasovagal    The patient is to return  in 3 months. The patient understood the treatment plan as outlined above.  There were no barriers to learning.      Satnam Cash MD     "

## 2023-11-29 NOTE — LETTER
11/29/2023      RE: Sharon Barrera  [address]  [place] [address]       Dear Colleague,    Thank you for the opportunity to participate in the care of your patient, Sharon Barrera, at the [facility] [place] [facility]. Please see a copy of my visit note below.    I am delighted to see Sharon Barrera in consultation.The primary encounter diagnosis was Vasovagal syncope. Diagnoses of Other chest pain, Palpitations, Dyspnea on exertion, and PVC's (premature ventricular contractions) were also pertinent to this visit.   As you know, the patient is a 28 year old  female. She   has a past medical history of Anxiety, Depressive disorder, Nondependent opioid abuse, continuous (H) (08/08/2016), Palpitations, Papanicolaou smear of cervix with low grade squamous intraepithelial lesion (LGSIL) (12/14/2016), PTSD (post-traumatic stress disorder), Shortness of breath, and Syncope..    On this visit, the patient states that she has continuous chest tightness, dyspnea, worsening chest pain when sitting compared to lying, palpitations.  The patient denies orthopnea and paroxysmal nocturnal dyspnea. She has intermittent bilat LE edema. She had syncope in the hospital related to pain.    The patient's cardiovascular risk factors include arrhythmia.    The following portions of the patient's history were reviewed and updated as appropriate: allergies, current medications, past family history, past medical history, past social history, past surgical history, and the problem list.    PMH: The patient's past medical history includes:    Past Medical History:   Diagnosis Date    Anxiety     Depressive disorder     post partum    Nondependent opioid abuse, continuous (H) 08/08/2016    Palpitations     Papanicolaou smear of cervix with low grade squamous intraepithelial lesion (LGSIL) 12/14/2016    PTSD (post-traumatic stress disorder)      [place] survivor    Shortness of breath     Syncope       Past Surgical History:   Procedure Laterality Date    APPENDECTOMY         The patient's medications as of the current encounter are:     Current Outpatient Medications   Medication Sig Dispense Refill    clobetasol (TEMOVATE) 0.05 % external solution Apply topically every 48 hours      ibuprofen (ADVIL/MOTRIN) 800 MG tablet take 1 tablet by mouth every 6 to 8 hours as needed for pain      ketoconazole (NIZORAL) 2 % external shampoo Apply topically every 48 hours      metoprolol tartrate (LOPRESSOR) 25 MG tablet Take 12.5 mg by mouth         Labs:     Admission on 03/17/2023, Discharged on 03/17/2023   Component Date Value Ref Range Status    Sodium 03/17/2023 138  136 - 145 mmol/L Final    Potassium 03/17/2023 4.3  3.4 - 5.3 mmol/L Final    Chloride 03/17/2023 103  98 - 107 mmol/L Final    Carbon Dioxide (CO2) 03/17/2023 24  22 - 29 mmol/L Final    Anion Gap 03/17/2023 11  7 - 15 mmol/L Final    Urea Nitrogen 03/17/2023 10.7  6.0 - 20.0 mg/dL Final    Creatinine 03/17/2023 0.64  0.51 - 0.95 mg/dL Final    Calcium 03/17/2023 9.9  8.6 - 10.0 mg/dL Final    Glucose 03/17/2023 100 (H)  70 - 99 mg/dL Final    Alkaline Phosphatase 03/17/2023 54  35 - 104 U/L Final    AST 03/17/2023 16  10 - 35 U/L Final    ALT 03/17/2023 12  10 - 35 U/L Final    Protein Total 03/17/2023 8.0  6.4 - 8.3 g/dL Final    Albumin 03/17/2023 5.1  3.5 - 5.2 g/dL Final    Bilirubin Total 03/17/2023 0.5  <=1.2 mg/dL Final    GFR Estimate 03/17/2023 >90  >60 mL/min/1.73m2 Final    eGFR calculated using 2021 CKD-EPI equation.    Lipase 03/17/2023 25  13 - 60 U/L Final    hCG Serum Qualitative 03/17/2023 Negative  Negative Final    This test is for screening purposes.  Results should be interpreted along with the clinical picture.  Confirmation testing is available if warranted by ordering [id number], HCG Quantitative Pregnancy.    WBC Count 03/17/2023 6.1  4.0 - 11.0 10e3/uL Final    RBC  Count 03/17/2023 4.69  3.80 - 5.20 10e6/uL Final    Hemoglobin 03/17/2023 14.0  11.7 - 15.7 g/dL Final    Hematocrit 03/17/2023 42.6  35.0 - 47.0 % Final    MCV 03/17/2023 91  78 - 100 fL Final    MCH 03/17/2023 29.9  26.5 - 33.0 pg Final    MCHC 03/17/2023 32.9  31.5 - 36.5 g/dL Final    RDW 03/17/2023 13.1  10.0 - 15.0 % Final    Platelet Count 03/17/2023 303  150 - 450 10e3/uL Final    % Neutrophils 03/17/2023 59  % Final    % Lymphocytes 03/17/2023 32  % Final    % Monocytes 03/17/2023 6  % Final    % Eosinophils 03/17/2023 1  % Final    % Basophils 03/17/2023 1  % Final    % Immature Granulocytes 03/17/2023 1  % Final    NRBCs per 100 WBC 03/17/2023 0  <1 /100 Final    Absolute Neutrophils 03/17/2023 3.7  1.6 - 8.3 10e3/uL Final    Absolute Lymphocytes 03/17/2023 1.9  0.8 - 5.3 10e3/uL Final    Absolute Monocytes 03/17/2023 0.4  0.0 - 1.3 10e3/uL Final    Absolute Eosinophils 03/17/2023 0.0  0.0 - 0.7 10e3/uL Final    Absolute Basophils 03/17/2023 0.1  0.0 - 0.2 10e3/uL Final    Absolute Immature Granulocytes 03/17/2023 0.0  <=0.4 10e3/uL Final    Absolute NRBCs 03/17/2023 0.0  10e3/uL Final    Hold Specimen 03/17/2023 C   Final    Hold Specimen 03/17/2023 1   Final    Color Urine 03/17/2023 Light Yellow  Colorless, Straw, Light Yellow, Yellow Final    Appearance Urine 03/17/2023 Clear  Clear Final    Glucose Urine 03/17/2023 Negative  Negative mg/dL Final    Bilirubin Urine 03/17/2023 Negative  Negative Final    Ketones Urine 03/17/2023 Trace (A)  Negative mg/dL Final    Specific Gravity Urine 03/17/2023 1.020  1.003 - 1.035 Final    Blood Urine 03/17/2023 Negative  Negative Final    pH Urine 03/17/2023 6.0  5.0 - 7.0 Final    Protein Albumin Urine 03/17/2023 Negative  Negative mg/dL Final    Urobilinogen Urine 03/17/2023 Normal  Normal, 2.0 mg/dL Final    Nitrite Urine 03/17/2023 Negative  Negative Final    Leukocyte Esterase Urine 03/17/2023 Trace (A)  Negative Final    Bacteria Urine 03/17/2023 Few (A)   None Seen /HPF Final    Mucus Urine 03/17/2023 Present (A)  None Seen /LPF Final    RBC Urine 03/17/2023 3 (H)  <=2 /HPF Final    WBC Urine 03/17/2023 2  <=5 /HPF Final    Squamous Epithelials Urine 03/17/2023 9 (H)  <=1 /HPF Final    Group A Strep antigen 03/17/2023 Negative  Negative Final    Group A strep by PCR 03/17/2023 Not Detected  Not Detected Final       Allergies:    Allergies   Allergen Reactions    Doxycycline Unknown    Sertraline Other (See Comments)     Suicidal ideation    Wellbutrin [Bupropion] Other (See Comments)     seizure       Family History:   Family History   Problem Relation Age of Onset    No Known Problems Mother     No Known Problems Father     Panhypopituitarism Brother     No Known Problems Son     No Known Problems Son     Glaucoma No family hx of     Macular Degeneration No family hx of        Psychosocial history:  reports that she has been smoking cigarettes. She has a 17.00 pack-year smoking history. She has been exposed to tobacco smoke. She does not have any smokeless tobacco history on file. She reports current alcohol use. She reports that she does not use drugs.    Review of systems: positive for chest pain, dyspnea on exertion, and lower extremity edema    In addition,   General: No change in weight, sleep or appetite.  Normal energy.  No fever or chills  Eyes: Negative for vision changes or eye problems  ENT: No problems with ears, nose or throat.  No difficulty swallowing.  Resp: No coughing, wheezing or shortness of breath  GI: No nausea, vomiting,  heartburn, abdominal pain, diarrhea, constipation or change in bowel habits  : No urinary frequency or dysuria, bladder or kidney problems  Musculoskeletal: No significant muscle or joint pains  Neurologic: No headaches, numbness, tingling, weakness, problems with balance or coordination  Psychiatric: Anxiety  Heme/immune/allergy: No history of bleeding or clotting problems or anemia.  Endocrine: No history of thyroid  "disease, diabetes or other endocrine disorders  Skin: No rashes,worrisome lesions or skin problems  Vascular:  No claudication, lifestyle limiting or otherwise; no ischemic rest pain; no non-healing ulcers. No weakness, No loss of sensation        Physical examination  Vitals: /81 (BP Location: Right arm, Patient Position: Sitting, Cuff Size: Adult Regular)   Pulse 81   Ht 1.57 m (5' 1.81\")   Wt 60.3 kg (132 lb 14.4 oz)   LMP 11/14/2023 (Exact Date)   SpO2 98%   BMI 24.46 kg/m    BMI= Body mass index is 24.46 kg/m .    In general, the patient is a pleasant female in no apparent distress.    HEENT: Normiocephalic and atraumatic.  PERRLA.  EOMI.  Sclerae white, not injected.  Nares clear.  Pharynx without erythema or exudate.  Dentition intact.    Neck: No adenopathy.  No thyromegaly. Carotids +2/2 bilaterally without bruits.  No jugular venous distension.   Heart:  The PMI is in the 5th ICS in the midclavicular line. There is no heave. Regular rate and rhythm. Normal S1, S2 splits physiologically. No murmur, rub, click, or gallop.    Lungs: Clear to asculation.  No ronchi, wheezes, rales.  No dullness to percussion.   Abdomen: Soft, nontender, nondistended. No organomegaly. No AAA.  No bruits.   Extremities: No clubbing, cyanosis, or edema. The pulses were intact bilaterally.   Neurological: The neurological examination reveal a patient who was oriented to person, place, and time.  The remainder of the examination was nonfocal.    Cardiac tests include:    11/17/23 - zio - freq PACs, occ PVCs  11/14/23 - ECG - NSR, PAC with aberrant conduction, NST  10/25/23 - CMR - normal LVEF, no scarring, infiltration  10/25/23 - CTA - no epicardial CAD  10/24/23 - echo - EF normal, no RWMA  10/24/23 - chest CT - no PE, normal lung parenchyma    Assessment and Plan    Dyspnea/chest pain - check PFTs, right heart cath  - pulmonary referral  2. Atypical CP - unlikely cardiac  3. Palpitations - freq PACs, occ PVCs  - on " beta blocker  - no significant arrhythmias  - no FH of SCD  4. Syncope - likely vasovagal    The patient is to return  in 3 months. The patient understood the treatment plan as outlined above.  There were no barriers to learning.      Satnam Cash MD

## 2023-11-29 NOTE — NURSING NOTE
Chief Complaint   Patient presents with    New Patient     New cardiology        Vitals were taken, medications reconciled.    Zaira Madera CMA  2:39 PM

## 2023-11-29 NOTE — PATIENT INSTRUCTIONS
Pulmonary Function test    Referral to Pulmonary Clinic to assess shortness of breath. They will call you to schedule.    Right Heart Catheterization (see below)    Follow up with Dr. Cash after testing.     Right Heart Catheterization      A Right Heart Cath is a test that measures how well your heart is pumping blood to your body and will assess the volume and pressures in your heart.       You will be scheduled for a Right Heart Catheterization at the [facility], [address] [place] [address]. You are to check in at the [facility] Waiting Area.     When you arrive you will be escorted back to the pre procedure area called 2A. Here they will insert an IV, draw labs, and obtain a short medical history. Please bring an updated list of your current medications.    A physician will come and talk with you about the procedure and obtain consent.    A nurse from the Cardiac Catheterization Lab will then escort you to the procedure area. You will receive a shot to numb the site where the catheter (tube) will enter your body.  This may be in the neck or groin - but likely your neck.  You will not receive sedation or anesthesia.     After the procedure you will recover for a short period (1-2 hours) on 6B or the cath lab.     Pre-procedure instructions - Right heart catheterization  Patient Education    [place] [address] - [facility] Waiting Room  Please plan on being at the hospital all day.  At any time, emergencies and/or urgent cases may come up which could delay the start of your procedure.    Pre-procedure instructions - Right heart catheterization  No solid food for 8 hours prior  Nothing to drink 2 hours prior to arrival time  You can take your morning medications (except diabetic and blood thinners) with sips of water  We recommend you arrange for a ride to drop you off and pick you up, in  the instance, you are unable to drive home, however you should be able to function as you normally would after the procedure    Diabetic Medication Instructions  Typical instructions for insulin diabetic medication holding are below. However, please reach out to your Primary Care Provider or Endocrinologist for specific instructions  DO NOT take any oral diabetic medication, short-acting diabetes medications/insulin, humalog or regular insulin the morning of your test  Take   dose of long-acting insulin (Lantus, Levemir) the day of your test  Remember to  bring your glucometer and insulin with you to take after your test if needed  DO NOT take injectable GLP-1 agonists semaglutide (Ozempic, Wegovy), dulaglutide (Trulicity), exenatide ER (Bydureon), tirzepatide (Mounjaro), or oral semaglutide (Rybelsus) for 7 days prior your procedure  Hold once daily injectable GLP-1 agonists exenatide (Byetta), liraglutide (Saxenda, Victoza) the day before and day of your procedure                Anticoagulation Medication Instructions   NA    You will need to follow up with one of our cardiology APPs 1-2 weeks after your procedure. If you need help scheduling or rescheduling your appointment, please call [phone]

## 2023-12-08 ENCOUNTER — OFFICE VISIT (OUTPATIENT)
Dept: PULMONOLOGY | Facility: CLINIC | Age: 28
End: 2023-12-08
Attending: INTERNAL MEDICINE
Payer: COMMERCIAL

## 2023-12-08 DIAGNOSIS — R06.09 DYSPNEA ON EXERTION: ICD-10-CM

## 2023-12-08 LAB
DLCOUNC-%PRED-PRE: 133 %
DLCOUNC-PRE: 27.17 ML/MIN/MMHG
DLCOUNC-PRED: 20.4 ML/MIN/MMHG
ERV-%PRED-PRE: 109 %
ERV-PRE: 1.23 L
ERV-PRED: 1.13 L
EXPTIME-PRE: 4.49 SEC
FEF2575-%PRED-PRE: 92 %
FEF2575-PRE: 2.92 L/SEC
FEF2575-PRED: 3.15 L/SEC
FEFMAX-%PRED-PRE: 89 %
FEFMAX-PRE: 5.85 L/SEC
FEFMAX-PRED: 6.55 L/SEC
FEV1-%PRED-PRE: 110 %
FEV1-PRE: 2.99 L
FEV1FEV6-PRE: 82 %
FEV1FEV6-PRED: 85 %
FEV1FVC-PRE: 76 %
FEV1FVC-PRED: 86 %
FEV1SVC-PRE: 75 %
FEV1SVC-PRED: 81 %
FIFMAX-PRE: 2.89 L/SEC
FRCPLETH-%PRED-PRE: 100 %
FRCPLETH-PRE: 2.51 L
FRCPLETH-PRED: 2.5 L
FVC-%PRED-PRE: 124 %
FVC-PRE: 3.91 L
FVC-PRED: 3.13 L
IC-%PRED-PRE: 122 %
IC-PRE: 2.77 L
IC-PRED: 2.25 L
RVPLETH-%PRED-PRE: 98 %
RVPLETH-PRE: 1.28 L
RVPLETH-PRED: 1.3 L
TLCPLETH-%PRED-PRE: 118 %
TLCPLETH-PRE: 5.27 L
TLCPLETH-PRED: 4.44 L
VA-%PRED-PRE: 113 %
VA-PRE: 4.97 L
VC-%PRED-PRE: 119 %
VC-PRE: 4 L
VC-PRED: 3.33 L

## 2023-12-08 PROCEDURE — 94729 DIFFUSING CAPACITY: CPT | Performed by: INTERNAL MEDICINE

## 2023-12-08 PROCEDURE — 94375 RESPIRATORY FLOW VOLUME LOOP: CPT | Performed by: INTERNAL MEDICINE

## 2023-12-08 PROCEDURE — 94726 PLETHYSMOGRAPHY LUNG VOLUMES: CPT | Performed by: INTERNAL MEDICINE

## 2023-12-08 NOTE — PROGRESS NOTES
Full PFTs completed as ordered. ABG attempted but missed on right wrist after positive brendan's test. Patient politely declined any further attempt to draw an ABG from lab staff. Patient left the PFT lab in no distress.

## 2023-12-10 ENCOUNTER — HEALTH MAINTENANCE LETTER (OUTPATIENT)
Age: 28
End: 2023-12-10

## 2023-12-11 ENCOUNTER — TELEPHONE (OUTPATIENT)
Dept: CARDIOLOGY | Facility: CLINIC | Age: 28
End: 2023-12-11
Payer: COMMERCIAL

## 2023-12-11 NOTE — TELEPHONE ENCOUNTER
Patient Contacted and we tried to call Hui Brush to reschedule, but Hui did not answer so we left a voicemail for the Hui to call back the patient or for the pt to call back and schedule the following:    Appointment type: RHC   Provider: Dr. Bui  Return date: prior to appt on 1/17/24  Specialty phone number: [phone] option 1  Additional appointment(s) needed: lab prior  Additonal Notes: 12/11 Spoke w/ pt about rescheduling RHC. Tried to call Hui Brush to reschedule but she did not answer; LVM for Hui Brush to call Sharon back to reschedule RHC and lab prior to 1/17 appt w/ Dr. Cash. MB

## 2024-01-10 ENCOUNTER — PRE VISIT (OUTPATIENT)
Dept: CARDIOLOGY | Facility: CLINIC | Age: 29
End: 2024-01-10
Payer: COMMERCIAL

## 2024-01-30 ENCOUNTER — HOSPITAL ENCOUNTER (EMERGENCY)
Facility: CLINIC | Age: 29
Discharge: LEFT BEFORE DISCHARGE | End: 2024-01-30
Payer: COMMERCIAL

## 2024-01-30 VITALS
RESPIRATION RATE: 20 BRPM | SYSTOLIC BLOOD PRESSURE: 127 MMHG | TEMPERATURE: 99.2 F | HEART RATE: 62 BPM | DIASTOLIC BLOOD PRESSURE: 64 MMHG | OXYGEN SATURATION: 99 %

## 2024-01-31 NOTE — ED NOTES
"Patient states that she thinks she is having a medication reaction and feels like she has a fever. Patient reports she \" I want a quick EKG and if it's normal , I'll go home and see my doctor in the morning\". Patient informed that we need to do a set of vitals to see if she is stable enough to wait in line. Vital signs are stable. Patient is asked to wait in line for triage. Patient is upset about waiting. Patient states, \" it will only take 2 minutes to get an EKG so that I can decide if I can skip the wait and go home and see my doctor in the morning\". Patient explained that several people are here for a cardiac workup that have been waiting for several hours. Patient then stated \" so your saying I'm normal and I can go home. Your a real bitch. I want your name!\" Patient was given this authors name and a phone number to call. Patient screamed out \"Bitch\" again and left.  "

## 2024-04-11 ENCOUNTER — APPOINTMENT (OUTPATIENT)
Dept: URBAN - METROPOLITAN AREA CLINIC 256 | Age: 29
Setting detail: DERMATOLOGY
End: 2024-04-11

## 2024-04-11 DIAGNOSIS — L40.0 PSORIASIS VULGARIS: ICD-10-CM

## 2024-04-11 DIAGNOSIS — L81.5 LEUKODERMA, NOT ELSEWHERE CLASSIFIED: ICD-10-CM

## 2024-04-11 PROCEDURE — OTHER MIPS QUALITY: OTHER

## 2024-04-11 PROCEDURE — 99214 OFFICE O/P EST MOD 30 MIN: CPT

## 2024-04-11 PROCEDURE — OTHER PATIENT SPECIFIC COUNSELING: OTHER

## 2024-04-11 PROCEDURE — OTHER REASSURANCE: OTHER

## 2024-04-11 PROCEDURE — OTHER PRESCRIPTION MEDICATION MANAGEMENT: OTHER

## 2024-04-11 PROCEDURE — OTHER COUNSELING: OTHER

## 2024-04-11 PROCEDURE — OTHER PRESCRIPTION: OTHER

## 2024-04-11 RX ORDER — FLUOCINOLONE ACETONIDE 0.11 MG/ML
OIL TOPICAL
Qty: 118.28 | Refills: 0 | Status: ERX | COMMUNITY
Start: 2024-04-11

## 2024-04-11 RX ORDER — KETOCONAZOLE 20 MG/ML
SHAMPOO, SUSPENSION TOPICAL
Qty: 120 | Refills: 8 | Status: ERX

## 2024-04-11 ASSESSMENT — PGA PSORIASIS: PGA PSORIASIS 2020: MILD

## 2024-04-11 ASSESSMENT — LOCATION DETAILED DESCRIPTION DERM
LOCATION DETAILED: LEFT PROXIMAL PRETIBIAL REGION
LOCATION DETAILED: RIGHT OCCIPITAL SCALP
LOCATION DETAILED: RIGHT PROXIMAL PRETIBIAL REGION

## 2024-04-11 ASSESSMENT — LOCATION SIMPLE DESCRIPTION DERM
LOCATION SIMPLE: LEFT PRETIBIAL REGION
LOCATION SIMPLE: RIGHT PRETIBIAL REGION
LOCATION SIMPLE: POSTERIOR SCALP

## 2024-04-11 ASSESSMENT — LOCATION ZONE DERM
LOCATION ZONE: SCALP
LOCATION ZONE: LEG

## 2024-04-11 ASSESSMENT — BSA PSORIASIS: % BODY COVERED IN PSORIASIS: 5

## 2024-04-11 ASSESSMENT — ITCH NUMERIC RATING SCALE: HOW SEVERE IS YOUR ITCHING?: 10

## 2024-04-11 NOTE — PROCEDURE: PATIENT SPECIFIC COUNSELING
- Patient presents with flaking and reported itching. \\n- Recommended patient use fluocinolone oil every night for 7-10 nights and washing in the AM with ketoconazole. \\n- Informed patient she is ok to use clobetasol solution on un-washed hair. She will notice more results with ketoconazole if she is consistent. \\n- Patient is to RTC in 3-4 weeks if no improvement.
Detail Level: Zone

## 2024-04-11 NOTE — PROCEDURE: PRESCRIPTION MEDICATION MANAGEMENT
Render In Strict Bullet Format?: No
Plan: Recheck in 3-4 weeks if no improvement
Detail Level: Zone
Initiate Treatment: With flares, apply Derma-Smoothe/FS Scalp Oil 0.01 % to scalp nightly and wash hair the next morning x 7-10 days or sooner if resolved. May use 2-3 times weekly as needed for spot treatment or maintenance.
Continue Regimen: Use ketoconazole 2 % shampoo to wash scalp 2-5 times weekly and face in the morning as needed for flares. Allow to sit for 3-5 minutes prior to rinsing.\\nApply clobetasol solution to the scalp twice daily for up to two weeks.

## 2024-04-11 NOTE — PROCEDURE: REASSURANCE
Detail Level: Detailed
Hide Additional Notes?: No
Additional Notes (Optional): Reassured benign appearing and recommended patient W/M and use appropriate sun protection.

## 2024-05-20 ENCOUNTER — APPOINTMENT (OUTPATIENT)
Dept: URBAN - METROPOLITAN AREA CLINIC 256 | Age: 29
Setting detail: DERMATOLOGY
End: 2024-05-20

## 2024-05-20 VITALS — WEIGHT: 130 LBS | HEIGHT: 60 IN

## 2024-05-20 DIAGNOSIS — L738 OTHER SPECIFIED DISEASES OF HAIR AND HAIR FOLLICLES: ICD-10-CM

## 2024-05-20 DIAGNOSIS — L663 OTHER SPECIFIED DISEASES OF HAIR AND HAIR FOLLICLES: ICD-10-CM

## 2024-05-20 PROBLEM — L02.821 FURUNCLE OF HEAD [ANY PART, EXCEPT FACE]: Status: ACTIVE | Noted: 2024-05-20

## 2024-05-20 PROCEDURE — OTHER COUNSELING: OTHER

## 2024-05-20 PROCEDURE — OTHER PRESCRIPTION: OTHER

## 2024-05-20 PROCEDURE — OTHER PATIENT SPECIFIC COUNSELING: OTHER

## 2024-05-20 PROCEDURE — 99213 OFFICE O/P EST LOW 20 MIN: CPT

## 2024-05-20 PROCEDURE — OTHER PRESCRIPTION MEDICATION MANAGEMENT: OTHER

## 2024-05-20 PROCEDURE — OTHER PHOTO-DOCUMENTATION: OTHER

## 2024-05-20 PROCEDURE — OTHER MIPS QUALITY: OTHER

## 2024-05-20 RX ORDER — CLINDAMYCIN PHOSPHATE 10 MG/ML
SOLUTION TOPICAL
Qty: 60 | Refills: 1 | Status: ERX | COMMUNITY
Start: 2024-05-20

## 2024-05-20 ASSESSMENT — PAIN INTENSITY VAS: HOW INTENSE IS YOUR PAIN 0 BEING NO PAIN, 10 BEING THE MOST SEVERE PAIN POSSIBLE?: 4/10 PAIN

## 2024-05-20 ASSESSMENT — LOCATION SIMPLE DESCRIPTION DERM: LOCATION SIMPLE: RIGHT SCALP

## 2024-05-20 ASSESSMENT — LOCATION DETAILED DESCRIPTION DERM: LOCATION DETAILED: RIGHT MEDIAL FRONTAL SCALP

## 2024-05-20 ASSESSMENT — LOCATION ZONE DERM: LOCATION ZONE: SCALP

## 2024-05-20 ASSESSMENT — SEVERITY ASSESSMENT: SEVERITY: MODERATE

## 2024-05-20 ASSESSMENT — BSA RASH: BSA RASH: 5

## 2024-05-20 NOTE — PROCEDURE: PRESCRIPTION MEDICATION MANAGEMENT
Detail Level: Zone
Render In Strict Bullet Format?: No
Initiate Treatment: Apply clindamycin phosphate 1 % topical solution to affected areas once to twice daily.

## 2024-05-20 NOTE — PROCEDURE: PATIENT SPECIFIC COUNSELING
Detail Level: Zone
-Discussed diagnosis\\n-To treat, discuss options of topical abx vs oral abx, pros and cons of each reviewed in detail and all questions answered \\n-The patient elected to trial the topical abx first and will consider oral abx in future if symptoms do not improve.

## 2024-05-20 NOTE — PROCEDURE: MIPS QUALITY
Quality 431: Preventive Care And Screening: Unhealthy Alcohol Use - Screening: Patient not screened for unhealthy alcohol use using a systematic screening method
Quality 226: Preventive Care And Screening: Tobacco Use: Screening And Cessation Intervention: Patient screened for tobacco use, is a smoker AND received Cessation Counseling within measurement period or in the six months prior to the measurement period
Detail Level: Detailed
Quality 130: Documentation Of Current Medications In The Medical Record: Current Medications Documented

## 2025-01-11 ENCOUNTER — HEALTH MAINTENANCE LETTER (OUTPATIENT)
Age: 30
End: 2025-01-11

## 2025-01-13 ENCOUNTER — APPOINTMENT (OUTPATIENT)
Age: 30
Setting detail: DERMATOLOGY
End: 2025-01-13

## 2025-01-13 VITALS — WEIGHT: 134 LBS | HEIGHT: 60 IN

## 2025-01-13 DIAGNOSIS — L40.0 PSORIASIS VULGARIS: ICD-10-CM

## 2025-01-13 PROCEDURE — OTHER PATIENT SPECIFIC COUNSELING: OTHER

## 2025-01-13 PROCEDURE — OTHER COUNSELING: OTHER

## 2025-01-13 PROCEDURE — OTHER PRESCRIPTION MEDICATION MANAGEMENT: OTHER

## 2025-01-13 PROCEDURE — OTHER MIPS QUALITY: OTHER

## 2025-01-13 PROCEDURE — OTHER CONSENT FOR TELEMEDICINE VISIT OBTAINED: OTHER

## 2025-01-13 PROCEDURE — 98006 SYNCH AUDIO-VIDEO EST MOD 30: CPT

## 2025-01-13 PROCEDURE — OTHER PRESCRIPTION: OTHER

## 2025-01-13 PROCEDURE — OTHER REASON FOR TELEMEDICINE VISIT: OTHER

## 2025-01-13 RX ORDER — KETOCONAZOLE 20 MG/ML
SHAMPOO, SUSPENSION TOPICAL
Qty: 120 | Refills: 3 | Status: ERX

## 2025-01-13 RX ORDER — CLOBETASOL PROPIONATE 0.5 MG/ML
SOLUTION TOPICAL QHS
Qty: 50 | Refills: 1 | Status: ERX

## 2025-01-13 ASSESSMENT — LOCATION ZONE DERM: LOCATION ZONE: SCALP

## 2025-01-13 ASSESSMENT — ITCH NUMERIC RATING SCALE: HOW SEVERE IS YOUR ITCHING?: 10

## 2025-01-13 ASSESSMENT — LOCATION DETAILED DESCRIPTION DERM: LOCATION DETAILED: RIGHT OCCIPITAL SCALP

## 2025-01-13 ASSESSMENT — PGA PSORIASIS: PGA PSORIASIS 2020: MODERATE

## 2025-01-13 ASSESSMENT — LOCATION SIMPLE DESCRIPTION DERM: LOCATION SIMPLE: POSTERIOR SCALP

## 2025-01-13 ASSESSMENT — BSA PSORIASIS: % BODY COVERED IN PSORIASIS: 5

## 2025-01-13 NOTE — PROCEDURE: PRESCRIPTION MEDICATION MANAGEMENT
Render In Strict Bullet Format?: No
Plan: Recheck in 4 weeks, or sooner if condition is worsening.
Detail Level: Zone
Continue Regimen: Use ketoconazole 2 % shampoo to wash scalp 2-5 times weekly and face in the morning as needed for flares. Allow to sit for 3-5 minutes prior to rinsing.\\nApply clobetasol 0.05% solution to the scalp twice daily for up to two weeks.

## 2025-01-13 NOTE — PROCEDURE: PATIENT SPECIFIC COUNSELING
- Reminded patient of steroid use for clobetasol and to use sparingly for flares. \\n- Will refill two medications. \\n- Advised pt to f/u in 4 weeks, or sooner if worsening.
Detail Level: Zone

## 2025-04-22 ENCOUNTER — APPOINTMENT (OUTPATIENT)
Dept: ULTRASOUND IMAGING | Facility: CLINIC | Age: 30
End: 2025-04-22
Attending: EMERGENCY MEDICINE
Payer: COMMERCIAL

## 2025-04-22 ENCOUNTER — APPOINTMENT (OUTPATIENT)
Dept: CT IMAGING | Facility: CLINIC | Age: 30
End: 2025-04-22
Attending: EMERGENCY MEDICINE
Payer: COMMERCIAL

## 2025-04-22 ENCOUNTER — HOSPITAL ENCOUNTER (INPATIENT)
Facility: CLINIC | Age: 30
End: 2025-04-22
Attending: EMERGENCY MEDICINE | Admitting: HOSPITALIST
Payer: COMMERCIAL

## 2025-04-22 DIAGNOSIS — K92.2 LOWER GI BLEEDING: ICD-10-CM

## 2025-04-22 DIAGNOSIS — R10.84 GENERALIZED ABDOMINAL PAIN: ICD-10-CM

## 2025-04-22 DIAGNOSIS — D50.9 IRON DEFICIENCY ANEMIA, UNSPECIFIED IRON DEFICIENCY ANEMIA TYPE: Primary | ICD-10-CM

## 2025-04-22 DIAGNOSIS — A08.11 NOROVIRUS: ICD-10-CM

## 2025-04-22 LAB
ALBUMIN SERPL BCG-MCNC: 4.5 G/DL (ref 3.5–5.2)
ALP SERPL-CCNC: 51 U/L (ref 40–150)
ALT SERPL W P-5'-P-CCNC: 16 U/L (ref 0–50)
ANION GAP SERPL CALCULATED.3IONS-SCNC: 12 MMOL/L (ref 7–15)
AST SERPL W P-5'-P-CCNC: 16 U/L (ref 0–45)
BASOPHILS # BLD AUTO: 0.1 10E3/UL (ref 0–0.2)
BASOPHILS NFR BLD AUTO: 1 %
BILIRUB SERPL-MCNC: 0.6 MG/DL
BUN SERPL-MCNC: 7.3 MG/DL (ref 6–20)
CALCIUM SERPL-MCNC: 9.3 MG/DL (ref 8.8–10.4)
CHLORIDE SERPL-SCNC: 106 MMOL/L (ref 98–107)
CREAT SERPL-MCNC: 0.7 MG/DL (ref 0.51–0.95)
CRP SERPL-MCNC: <3 MG/L
EGFRCR SERPLBLD CKD-EPI 2021: >90 ML/MIN/1.73M2
EOSINOPHIL # BLD AUTO: 0 10E3/UL (ref 0–0.7)
EOSINOPHIL NFR BLD AUTO: 0 %
ERYTHROCYTE [DISTWIDTH] IN BLOOD BY AUTOMATED COUNT: 16.3 % (ref 10–15)
ERYTHROCYTE [SEDIMENTATION RATE] IN BLOOD BY WESTERGREN METHOD: 10 MM/HR (ref 0–20)
GLUCOSE SERPL-MCNC: 83 MG/DL (ref 70–99)
HCG SERPL QL: NEGATIVE
HCO3 SERPL-SCNC: 25 MMOL/L (ref 22–29)
HCT VFR BLD AUTO: 31.1 % (ref 35–47)
HGB BLD-MCNC: 9.4 G/DL (ref 11.7–15.7)
IMM GRANULOCYTES # BLD: 0 10E3/UL
IMM GRANULOCYTES NFR BLD: 0 %
IRON BINDING CAPACITY (ROCHE): 429 UG/DL (ref 240–430)
IRON SATN MFR SERPL: 5 % (ref 15–46)
IRON SERPL-MCNC: 21 UG/DL (ref 37–145)
LIPASE SERPL-CCNC: 26 U/L (ref 13–60)
LYMPHOCYTES # BLD AUTO: 2.6 10E3/UL (ref 0.8–5.3)
LYMPHOCYTES NFR BLD AUTO: 43 %
MCH RBC QN AUTO: 22.6 PG (ref 26.5–33)
MCHC RBC AUTO-ENTMCNC: 30.2 G/DL (ref 31.5–36.5)
MCV RBC AUTO: 75 FL (ref 78–100)
MONOCYTES # BLD AUTO: 0.3 10E3/UL (ref 0–1.3)
MONOCYTES NFR BLD AUTO: 6 %
NEUTROPHILS # BLD AUTO: 3.1 10E3/UL (ref 1.6–8.3)
NEUTROPHILS NFR BLD AUTO: 50 %
NRBC # BLD AUTO: 0 10E3/UL
NRBC BLD AUTO-RTO: 0 /100
PLATELET # BLD AUTO: 470 10E3/UL (ref 150–450)
POTASSIUM SERPL-SCNC: 4.2 MMOL/L (ref 3.4–5.3)
PROT SERPL-MCNC: 7.2 G/DL (ref 6.4–8.3)
RBC # BLD AUTO: 4.16 10E6/UL (ref 3.8–5.2)
SODIUM SERPL-SCNC: 143 MMOL/L (ref 135–145)
WBC # BLD AUTO: 6.1 10E3/UL (ref 4–11)

## 2025-04-22 PROCEDURE — 250N000013 HC RX MED GY IP 250 OP 250 PS 637: Performed by: EMERGENCY MEDICINE

## 2025-04-22 PROCEDURE — 96374 THER/PROPH/DIAG INJ IV PUSH: CPT

## 2025-04-22 PROCEDURE — 85041 AUTOMATED RBC COUNT: CPT | Performed by: EMERGENCY MEDICINE

## 2025-04-22 PROCEDURE — 84703 CHORIONIC GONADOTROPIN ASSAY: CPT | Performed by: EMERGENCY MEDICINE

## 2025-04-22 PROCEDURE — 250N000009 HC RX 250: Performed by: EMERGENCY MEDICINE

## 2025-04-22 PROCEDURE — 85652 RBC SED RATE AUTOMATED: CPT | Performed by: EMERGENCY MEDICINE

## 2025-04-22 PROCEDURE — 84132 ASSAY OF SERUM POTASSIUM: CPT | Performed by: EMERGENCY MEDICINE

## 2025-04-22 PROCEDURE — 250N000011 HC RX IP 250 OP 636: Performed by: INTERNAL MEDICINE

## 2025-04-22 PROCEDURE — 250N000011 HC RX IP 250 OP 636: Performed by: EMERGENCY MEDICINE

## 2025-04-22 PROCEDURE — 76856 US EXAM PELVIC COMPLETE: CPT

## 2025-04-22 PROCEDURE — 99222 1ST HOSP IP/OBS MODERATE 55: CPT | Performed by: INTERNAL MEDICINE

## 2025-04-22 PROCEDURE — 258N000003 HC RX IP 258 OP 636: Performed by: EMERGENCY MEDICINE

## 2025-04-22 PROCEDURE — 86140 C-REACTIVE PROTEIN: CPT | Performed by: EMERGENCY MEDICINE

## 2025-04-22 PROCEDURE — 250N000013 HC RX MED GY IP 250 OP 250 PS 637: Performed by: INTERNAL MEDICINE

## 2025-04-22 PROCEDURE — 82728 ASSAY OF FERRITIN: CPT | Performed by: INTERNAL MEDICINE

## 2025-04-22 PROCEDURE — 258N000003 HC RX IP 258 OP 636: Performed by: INTERNAL MEDICINE

## 2025-04-22 PROCEDURE — 36415 COLL VENOUS BLD VENIPUNCTURE: CPT | Performed by: EMERGENCY MEDICINE

## 2025-04-22 PROCEDURE — G0378 HOSPITAL OBSERVATION PER HR: HCPCS

## 2025-04-22 PROCEDURE — 83690 ASSAY OF LIPASE: CPT | Performed by: EMERGENCY MEDICINE

## 2025-04-22 PROCEDURE — 96361 HYDRATE IV INFUSION ADD-ON: CPT

## 2025-04-22 PROCEDURE — 74174 CTA ABD&PLVS W/CONTRAST: CPT

## 2025-04-22 PROCEDURE — 96375 TX/PRO/DX INJ NEW DRUG ADDON: CPT

## 2025-04-22 PROCEDURE — 83550 IRON BINDING TEST: CPT | Performed by: INTERNAL MEDICINE

## 2025-04-22 PROCEDURE — 99285 EMERGENCY DEPT VISIT HI MDM: CPT | Mod: 25

## 2025-04-22 RX ORDER — MAGNESIUM HYDROXIDE/ALUMINUM HYDROXICE/SIMETHICONE 120; 1200; 1200 MG/30ML; MG/30ML; MG/30ML
30 SUSPENSION ORAL ONCE
Status: COMPLETED | OUTPATIENT
Start: 2025-04-22 | End: 2025-04-22

## 2025-04-22 RX ORDER — ONDANSETRON 2 MG/ML
4 INJECTION INTRAMUSCULAR; INTRAVENOUS EVERY 6 HOURS PRN
Status: DISCONTINUED | OUTPATIENT
Start: 2025-04-22 | End: 2025-04-25 | Stop reason: HOSPADM

## 2025-04-22 RX ORDER — AMOXICILLIN 250 MG
1 CAPSULE ORAL 2 TIMES DAILY PRN
Status: DISCONTINUED | OUTPATIENT
Start: 2025-04-22 | End: 2025-04-25 | Stop reason: HOSPADM

## 2025-04-22 RX ORDER — ACETAMINOPHEN 500 MG
1000 TABLET ORAL ONCE
Status: COMPLETED | OUTPATIENT
Start: 2025-04-22 | End: 2025-04-22

## 2025-04-22 RX ORDER — ONDANSETRON 4 MG/1
4 TABLET, ORALLY DISINTEGRATING ORAL EVERY 6 HOURS PRN
Status: DISCONTINUED | OUTPATIENT
Start: 2025-04-22 | End: 2025-04-25 | Stop reason: HOSPADM

## 2025-04-22 RX ORDER — NALOXONE HYDROCHLORIDE 0.4 MG/ML
0.4 INJECTION, SOLUTION INTRAMUSCULAR; INTRAVENOUS; SUBCUTANEOUS
Status: DISCONTINUED | OUTPATIENT
Start: 2025-04-22 | End: 2025-04-25 | Stop reason: HOSPADM

## 2025-04-22 RX ORDER — LIDOCAINE 40 MG/G
CREAM TOPICAL
Status: DISCONTINUED | OUTPATIENT
Start: 2025-04-22 | End: 2025-04-25 | Stop reason: HOSPADM

## 2025-04-22 RX ORDER — SIMETHICONE 40MG/0.6ML
40 SUSPENSION, DROPS(FINAL DOSAGE FORM)(ML) ORAL EVERY 6 HOURS PRN
Status: DISCONTINUED | OUTPATIENT
Start: 2025-04-22 | End: 2025-04-25 | Stop reason: HOSPADM

## 2025-04-22 RX ORDER — OXYCODONE HYDROCHLORIDE 5 MG/1
5 TABLET ORAL EVERY 6 HOURS PRN
Status: DISCONTINUED | OUTPATIENT
Start: 2025-04-22 | End: 2025-04-25 | Stop reason: HOSPADM

## 2025-04-22 RX ORDER — NALOXONE HYDROCHLORIDE 0.4 MG/ML
0.2 INJECTION, SOLUTION INTRAMUSCULAR; INTRAVENOUS; SUBCUTANEOUS
Status: DISCONTINUED | OUTPATIENT
Start: 2025-04-22 | End: 2025-04-25 | Stop reason: HOSPADM

## 2025-04-22 RX ORDER — ACETAMINOPHEN 650 MG/1
650 SUPPOSITORY RECTAL EVERY 4 HOURS PRN
Status: DISCONTINUED | OUTPATIENT
Start: 2025-04-22 | End: 2025-04-25 | Stop reason: HOSPADM

## 2025-04-22 RX ORDER — ONDANSETRON 2 MG/ML
4 INJECTION INTRAMUSCULAR; INTRAVENOUS EVERY 30 MIN PRN
Status: DISCONTINUED | OUTPATIENT
Start: 2025-04-22 | End: 2025-04-22

## 2025-04-22 RX ORDER — IOPAMIDOL 755 MG/ML
66 INJECTION, SOLUTION INTRAVASCULAR ONCE
Status: COMPLETED | OUTPATIENT
Start: 2025-04-22 | End: 2025-04-22

## 2025-04-22 RX ORDER — AMOXICILLIN 250 MG
2 CAPSULE ORAL 2 TIMES DAILY PRN
Status: DISCONTINUED | OUTPATIENT
Start: 2025-04-22 | End: 2025-04-25 | Stop reason: HOSPADM

## 2025-04-22 RX ORDER — HYDROCODONE BITARTRATE AND ACETAMINOPHEN 5; 325 MG/1; MG/1
1-2 TABLET ORAL
COMMUNITY
Start: 2025-03-17 | End: 2025-04-22

## 2025-04-22 RX ORDER — VALACYCLOVIR HYDROCHLORIDE 500 MG/1
TABLET, FILM COATED ORAL
COMMUNITY
Start: 2025-03-17

## 2025-04-22 RX ORDER — PROCHLORPERAZINE MALEATE 10 MG
10 TABLET ORAL EVERY 6 HOURS PRN
Status: DISCONTINUED | OUTPATIENT
Start: 2025-04-22 | End: 2025-04-25 | Stop reason: HOSPADM

## 2025-04-22 RX ORDER — SODIUM CHLORIDE 9 MG/ML
INJECTION, SOLUTION INTRAVENOUS CONTINUOUS
Status: ACTIVE | OUTPATIENT
Start: 2025-04-22 | End: 2025-04-23

## 2025-04-22 RX ORDER — ACETAMINOPHEN 325 MG/1
650 TABLET ORAL EVERY 4 HOURS PRN
Status: DISCONTINUED | OUTPATIENT
Start: 2025-04-22 | End: 2025-04-25 | Stop reason: HOSPADM

## 2025-04-22 RX ADMIN — SODIUM CHLORIDE 1000 ML: 9 INJECTION, SOLUTION INTRAVENOUS at 17:17

## 2025-04-22 RX ADMIN — IOPAMIDOL 82 ML: 755 INJECTION, SOLUTION INTRAVENOUS at 18:25

## 2025-04-22 RX ADMIN — ACETAMINOPHEN 1000 MG: 500 TABLET ORAL at 17:28

## 2025-04-22 RX ADMIN — SODIUM CHLORIDE 71 ML: 9 INJECTION, SOLUTION INTRAVENOUS at 18:26

## 2025-04-22 RX ADMIN — SODIUM CHLORIDE: 0.9 INJECTION, SOLUTION INTRAVENOUS at 22:42

## 2025-04-22 RX ADMIN — ALUMINUM HYDROXIDE, MAGNESIUM HYDROXIDE, AND SIMETHICONE 30 ML: 200; 200; 20 SUSPENSION ORAL at 17:29

## 2025-04-22 RX ADMIN — ONDANSETRON 4 MG: 4 TABLET, ORALLY DISINTEGRATING ORAL at 22:10

## 2025-04-22 RX ADMIN — PANTOPRAZOLE SODIUM 40 MG: 40 INJECTION, POWDER, FOR SOLUTION INTRAVENOUS at 20:38

## 2025-04-22 RX ADMIN — ONDANSETRON 4 MG: 2 INJECTION, SOLUTION INTRAMUSCULAR; INTRAVENOUS at 17:38

## 2025-04-22 RX ADMIN — OXYCODONE HYDROCHLORIDE 2.5 MG: 5 TABLET ORAL at 22:40

## 2025-04-22 ASSESSMENT — ACTIVITIES OF DAILY LIVING (ADL)
ADLS_ACUITY_SCORE: 45
ADLS_ACUITY_SCORE: 16

## 2025-04-22 ASSESSMENT — COLUMBIA-SUICIDE SEVERITY RATING SCALE - C-SSRS
6. HAVE YOU EVER DONE ANYTHING, STARTED TO DO ANYTHING, OR PREPARED TO DO ANYTHING TO END YOUR LIFE?: NO
1. IN THE PAST MONTH, HAVE YOU WISHED YOU WERE DEAD OR WISHED YOU COULD GO TO SLEEP AND NOT WAKE UP?: NO
2. HAVE YOU ACTUALLY HAD ANY THOUGHTS OF KILLING YOURSELF IN THE PAST MONTH?: NO

## 2025-04-22 NOTE — ED PROVIDER NOTES
Emergency Department Note      History of Present Illness     Chief Complaint   Rectal Bleeding      HPI   Sharon Barrera is a 30 year old female who presents to the ED via EMS for evaluation of rectal bleeding. Patient reports diarrhea for the past 6 months, with hematochezia starting this morning. She was seen at [facility] earlier today and referred to the ED due to rectal bleeding and low hemoglobin. Patient reports having blood work done on 4/17 that came back abnormal. Her hemoglobin was 9.3. She was also tested for celiac disease, which came back negative.  Patient reports onset of hematochezia and vomiting this morning. Notes she is vomiting water because she has not been eating much recently. Endorses dehydration and no appetite. After eating, she develops nausea and gas, which has been ongoing this past week. She also notes occasional headaches and chest pressure, which she assumes is to due her body being under stress, along with her history of SVT. Since this morning she has been unable to sit up due to weakness and lightheadedness. Notes her fingers are occasionally white and tingly. Reports a recent temperature of 101, but has been taking meds to control it. Patient currently endorses abdominal pain. Denies leg swelling. History of appendectomy. She does not take Tums or Prilosec for heartburn. Patient's family has history of hemorrhoids and anal fissures.     Independent Historian   None    Review of External Notes   I reviewed the urgent care clinic note from today prior to arrival:  Given her recent history with chronic diarrhea, bleeding, abdominal pain and elevated IGA, along with a dropping Hgb, I suspect Inflammatory Bowel disease. She states she can't even sit up due to her lack of energy, so ambulance is called and she is taken to the ED for further evaluation.     Past Medical History     Medical History and Problem List   Anxiety  Depression  Nondependent opioid  abuse  PTSD  HSV  Vasovagal syncope  Premature atrial contraction   Narcotic dependence  Seizure disorder  Tobacco abuse  IUD   Dyspnea  Mild biventricular nonischemic cardiomyopathy  Smoker   Ventricular tachycardia  Renal stones  Intramural leiomyoma of uterus   CHINO   Pyelonephritis  UTI     Medications   Lopressor  Valacyclovir     Surgical History   Appendectomy     Physical Exam     Patient Vitals for the past 24 hrs:   BP Temp Temp src Pulse Resp SpO2   04/22/25 1654 112/71 99.1  F (37.3  C) Temporal 64 18 100 %     Physical Exam  General: Well appearing, nontoxic.  Resting comfortably  Head:  Scalp, face, and head appear normal  Eyes:  Pupils are equal, round    Conjunctivae non-injected and sclerae white  ENT:    The external nose is normal    Pinnae are normal  Neck:  Normal range of motion    There is no rigidity noted    Trachea is in the midline  CV:  Regular rate and rhythm     Normal S1/S2, no S3/S4    No murmur or rub. Radial pulses 2+ bilaterally.  Resp:  Lungs are clear and equal bilaterally  There is no tachypnea    No increased work of breathing    No rales, wheezing, or rhonchi  GI:  Abdomen is soft, no rigidity or guarding    No distension, or mass    Diffuse abdominal tenderness. No rebound tenderness   MS:  Normal muscular tone    Symmetric motor strength    No lower extremity edema  Skin:  Mild pallor. No rash or acute skin lesions noted  Neuro:  Awake and alert  Speech is normal and fluent  Moves all extremities spontaneously  Psych: Normal affect. Appropriate interactions.      Diagnostics     Lab Results   Labs Ordered and Resulted from Time of ED Arrival to Time of ED Departure   CBC WITH PLATELETS AND DIFFERENTIAL - Abnormal       Result Value    WBC Count 6.1      RBC Count 4.16      Hemoglobin 9.4 (*)     Hematocrit 31.1 (*)     MCV 75 (*)     MCH 22.6 (*)     MCHC 30.2 (*)     RDW 16.3 (*)     Platelet Count 470 (*)     % Neutrophils 50      % Lymphocytes 43      % Monocytes 6      %  Eosinophils 0      % Basophils 1      % Immature Granulocytes 0      NRBCs per 100 WBC 0      Absolute Neutrophils 3.1      Absolute Lymphocytes 2.6      Absolute Monocytes 0.3      Absolute Eosinophils 0.0      Absolute Basophils 0.1      Absolute Immature Granulocytes 0.0      Absolute NRBCs 0.0     COMPREHENSIVE METABOLIC PANEL - Normal    Sodium 143      Potassium 4.2      Carbon Dioxide (CO2) 25      Anion Gap 12      Urea Nitrogen 7.3      Creatinine 0.70      GFR Estimate >90      Calcium 9.3      Chloride 106      Glucose 83      Alkaline Phosphatase 51      AST 16      ALT 16      Protein Total 7.2      Albumin 4.5      Bilirubin Total 0.6     LIPASE - Normal    Lipase 26     ERYTHROCYTE SEDIMENTATION RATE AUTO - Normal    Erythrocyte Sedimentation Rate 10     CRP INFLAMMATION - Normal    CRP Inflammation <3.00     HCG QUALITATIVE PREGNANCY - Normal    hCG Serum Qualitative Negative         Imaging   CTA GI Bleed   Final Result   IMPRESSION:   1.  No active bleeding into the gastrointestinal system.   2.  Fluid in the endometrial canal with an 18 mm x 15 mm x 10 mm soft tissue focus along the endometrium. Recommend further evaluation, perhaps with pelvic ultrasound, sonohysterogram, and/or MRI.         US Pelvic Complete w Transvaginal    (Results Pending)       Independent Interpretation   None    ED Course      Medications Administered   Medications   ondansetron (ZOFRAN) injection 4 mg (4 mg Intravenous $Given 4/22/25 1738)   pantoprazole (PROTONIX) IV push injection 40 mg (has no administration in time range)   sodium chloride 0.9% BOLUS 1,000 mL (0 mLs Intravenous Stopped 4/22/25 1854)   alum & mag hydroxide-simethicone (MAALOX) suspension 30 mL (30 mLs Oral $Given 4/22/25 1729)   acetaminophen (TYLENOL) tablet 1,000 mg (1,000 mg Oral $Given 4/22/25 1728)   iopamidol (ISOVUE-370) solution 66 mL (82 mLs Intravenous $Given 4/22/25 1825)   sodium chloride 0.9 % bag for CT scan flush (71 mLs Intravenous  $Given 4/22/25 1826)       Procedures   Procedures     Discussion of Management   See below     ED Course   ED Course as of 04/22/25 2005   Tue Apr 22, 2025   1700 I obtained history and examined the patient as noted above.    1934 I rechecked and updated the patient.    1958 Case discussed with hospitalist, Dr. Taylor       Additional Documentation  None    Medical Decision Making / Diagnosis     CMS Diagnoses: None    MIPS       None    [facility]   Sharon Barrera is a 30 year old female who presents for evaluation of rectal bleeding and abdominal pain.  Patient is uncomfortable but otherwise well-appearing, hemodynamically stable and afebrile.  Abdominal exam is benign without evidence of peritonitis or acute surgical emergency.  Patient has seen gastroenterology as an outpatient and workup has been started but she has not yet had a colonoscopy or endoscopy.  CMP, lipase are normal.  CRP and ESR are normal.  hCG is negative.  CBC with hemoglobin of 9.4 and microcytic anemia.  No leukocytosis.  CT of the abdomen and pelvis was obtained and negative for any acute findings.  No significant intestinal inflammation, bowel obstruction, evidence of active bleeding or any other acute pathology.  A soft tissue density abnormality was seen within the endometrium of the uterus.  Patient reports that she has a history of endometrial polyp in the past.  She denies any vaginal bleeding or pelvic symptoms at this time.  Pelvic US ordered and pending for further evaluation.  Given her progressive symptoms, difficulty eating, abdominal pain and GI bleeding patient would benefit from expedited GI consultation and consideration for colonoscopy and endoscopy.  She will be admitted to the hospital for ongoing monitoring evaluation and treatment.  No indication for emergent GI consultation from the ER overnight.  The case was discussed with the hospitalist and the patient was admitted in stable condition.    Disposition   The patient was  admitted to the hospital.     Diagnosis     ICD-10-CM    1. Lower GI bleeding  K92.2       2. Generalized abdominal pain  R10.84            Scribe Disclosure:  I, Morenita Schneider, am serving as a scribe at 5:22 PM on 4/22/2025 to document services personally performed by Pollo Snowden MD based on my observations and the provider's statements to me.        Pollo Snowden MD  04/22/25 2009

## 2025-04-22 NOTE — ED TRIAGE NOTES
Patient BIBA from UC with rectal bleeding and low hgb. Patient has had diarrhea for several months, low PO intake the last few days. Zofran given en route.

## 2025-04-23 LAB
ANION GAP SERPL CALCULATED.3IONS-SCNC: 9 MMOL/L (ref 7–15)
BUN SERPL-MCNC: 8.2 MG/DL (ref 6–20)
CALCIUM SERPL-MCNC: 8.3 MG/DL (ref 8.8–10.4)
CHLORIDE SERPL-SCNC: 111 MMOL/L (ref 98–107)
CREAT SERPL-MCNC: 0.82 MG/DL (ref 0.51–0.95)
EGFRCR SERPLBLD CKD-EPI 2021: >90 ML/MIN/1.73M2
ERYTHROCYTE [DISTWIDTH] IN BLOOD BY AUTOMATED COUNT: 16.3 % (ref 10–15)
FERRITIN SERPL-MCNC: 5 NG/ML (ref 6–175)
GLUCOSE SERPL-MCNC: 97 MG/DL (ref 70–99)
HCO3 SERPL-SCNC: 22 MMOL/L (ref 22–29)
HCT VFR BLD AUTO: 28.6 % (ref 35–47)
HGB BLD-MCNC: 8.3 G/DL (ref 11.7–15.7)
MCH RBC QN AUTO: 22.4 PG (ref 26.5–33)
MCHC RBC AUTO-ENTMCNC: 29 G/DL (ref 31.5–36.5)
MCV RBC AUTO: 77 FL (ref 78–100)
PLATELET # BLD AUTO: 377 10E3/UL (ref 150–450)
POTASSIUM SERPL-SCNC: 4.2 MMOL/L (ref 3.4–5.3)
RBC # BLD AUTO: 3.71 10E6/UL (ref 3.8–5.2)
SODIUM SERPL-SCNC: 142 MMOL/L (ref 135–145)
TSH SERPL DL<=0.005 MIU/L-ACNC: 1.74 UIU/ML (ref 0.3–4.2)
WBC # BLD AUTO: 5.4 10E3/UL (ref 4–11)

## 2025-04-23 PROCEDURE — 96361 HYDRATE IV INFUSION ADD-ON: CPT

## 2025-04-23 PROCEDURE — 84443 ASSAY THYROID STIM HORMONE: CPT | Performed by: HOSPITALIST

## 2025-04-23 PROCEDURE — 99233 SBSQ HOSP IP/OBS HIGH 50: CPT | Performed by: HOSPITALIST

## 2025-04-23 PROCEDURE — 250N000013 HC RX MED GY IP 250 OP 250 PS 637: Performed by: INTERNAL MEDICINE

## 2025-04-23 PROCEDURE — 85014 HEMATOCRIT: CPT | Performed by: INTERNAL MEDICINE

## 2025-04-23 PROCEDURE — 250N000011 HC RX IP 250 OP 636: Performed by: INTERNAL MEDICINE

## 2025-04-23 PROCEDURE — 84132 ASSAY OF SERUM POTASSIUM: CPT | Performed by: INTERNAL MEDICINE

## 2025-04-23 PROCEDURE — 36415 COLL VENOUS BLD VENIPUNCTURE: CPT | Performed by: INTERNAL MEDICINE

## 2025-04-23 PROCEDURE — 87493 C DIFF AMPLIFIED PROBE: CPT | Performed by: HOSPITALIST

## 2025-04-23 PROCEDURE — 87507 IADNA-DNA/RNA PROBE TQ 12-25: CPT | Performed by: HOSPITALIST

## 2025-04-23 PROCEDURE — 96376 TX/PRO/DX INJ SAME DRUG ADON: CPT

## 2025-04-23 PROCEDURE — 250N000011 HC RX IP 250 OP 636: Mod: JZ | Performed by: HOSPITALIST

## 2025-04-23 PROCEDURE — 96375 TX/PRO/DX INJ NEW DRUG ADDON: CPT

## 2025-04-23 PROCEDURE — 83993 ASSAY FOR CALPROTECTIN FECAL: CPT | Performed by: INTERNAL MEDICINE

## 2025-04-23 PROCEDURE — 93005 ELECTROCARDIOGRAM TRACING: CPT

## 2025-04-23 PROCEDURE — 93010 ELECTROCARDIOGRAM REPORT: CPT | Performed by: INTERNAL MEDICINE

## 2025-04-23 PROCEDURE — G0378 HOSPITAL OBSERVATION PER HR: HCPCS

## 2025-04-23 PROCEDURE — 258N000003 HC RX IP 258 OP 636: Performed by: HOSPITALIST

## 2025-04-23 RX ORDER — PANTOPRAZOLE SODIUM 40 MG/1
40 TABLET, DELAYED RELEASE ORAL
Status: DISCONTINUED | OUTPATIENT
Start: 2025-04-24 | End: 2025-04-23

## 2025-04-23 RX ORDER — LIDOCAINE 40 MG/G
CREAM TOPICAL
Status: CANCELLED | OUTPATIENT
Start: 2025-04-23

## 2025-04-23 RX ORDER — SODIUM CHLORIDE 9 MG/ML
INJECTION, SOLUTION INTRAVENOUS CONTINUOUS
Status: ACTIVE | OUTPATIENT
Start: 2025-04-23 | End: 2025-04-23

## 2025-04-23 RX ORDER — PANTOPRAZOLE SODIUM 40 MG/1
40 TABLET, DELAYED RELEASE ORAL
Status: DISCONTINUED | OUTPATIENT
Start: 2025-04-24 | End: 2025-04-25 | Stop reason: HOSPADM

## 2025-04-23 RX ORDER — ONDANSETRON 2 MG/ML
4 INJECTION INTRAMUSCULAR; INTRAVENOUS
Status: CANCELLED | OUTPATIENT
Start: 2025-04-23

## 2025-04-23 RX ADMIN — PANTOPRAZOLE SODIUM 40 MG: 40 INJECTION, POWDER, FOR SOLUTION INTRAVENOUS at 11:43

## 2025-04-23 RX ADMIN — OXYCODONE HYDROCHLORIDE 2.5 MG: 5 TABLET ORAL at 16:14

## 2025-04-23 RX ADMIN — ONDANSETRON 4 MG: 4 TABLET, ORALLY DISINTEGRATING ORAL at 19:44

## 2025-04-23 RX ADMIN — IRON SUCROSE 300 MG: 20 INJECTION, SOLUTION INTRAVENOUS at 17:29

## 2025-04-23 RX ADMIN — OXYCODONE HYDROCHLORIDE 2.5 MG: 5 TABLET ORAL at 07:58

## 2025-04-23 RX ADMIN — PROCHLORPERAZINE EDISYLATE 10 MG: 5 INJECTION INTRAMUSCULAR; INTRAVENOUS at 22:23

## 2025-04-23 RX ADMIN — OXYCODONE HYDROCHLORIDE 2.5 MG: 5 TABLET ORAL at 17:37

## 2025-04-23 RX ADMIN — PROCHLORPERAZINE MALEATE 10 MG: 10 TABLET ORAL at 08:06

## 2025-04-23 RX ADMIN — PROCHLORPERAZINE MALEATE 10 MG: 10 TABLET ORAL at 16:14

## 2025-04-23 RX ADMIN — POLYETHYLENE GLYCOL 3350, SODIUM SULFATE ANHYDROUS, SODIUM BICARBONATE, SODIUM CHLORIDE, POTASSIUM CHLORIDE 4000 ML: 236; 22.74; 6.74; 5.86; 2.97 POWDER, FOR SOLUTION ORAL at 18:53

## 2025-04-23 ASSESSMENT — ACTIVITIES OF DAILY LIVING (ADL)
ADLS_ACUITY_SCORE: 20
ADLS_ACUITY_SCORE: 21
ADLS_ACUITY_SCORE: 20
ADLS_ACUITY_SCORE: 21
ADLS_ACUITY_SCORE: 21
ADLS_ACUITY_SCORE: 20
ADLS_ACUITY_SCORE: 20
ADLS_ACUITY_SCORE: 21
ADLS_ACUITY_SCORE: 21
ADLS_ACUITY_SCORE: 20
ADLS_ACUITY_SCORE: 21
ADLS_ACUITY_SCORE: 20
ADLS_ACUITY_SCORE: 21
ADLS_ACUITY_SCORE: 21
ADLS_ACUITY_SCORE: 20

## 2025-04-23 NOTE — PLAN OF CARE
Surgery/POD#: admit with diarrhea, nausea  Behavior & Aggression:   Fall Risk: No  Orientation:A&O4  ABNL VS/O2: WNL  ABNL Labs: None  Pain Management:abdominal pain, oxy given once   Bowel/Bladder: continent of urine, no BM yet  Diet:clears, NPO at midnight for EGD/Colonoscopy  Number of times OUT OF BED this shift 2  Tests/Procedures: EGD/Colonoscopy tomorrow  Anticipated  DC Date: pending  Significant Information: Bowel prep at 1800

## 2025-04-23 NOTE — PLAN OF CARE
Date & Time: 4/22/25   Surgery/POD#: ED admit with ongoing diarrhea, N/V, bloody stools  Behavior & Aggression: green  Fall Risk: no  Orientation:AOx4  ABNL VS/O2:Soft BP, other VSS  ABNL Labs: Hgb 9.4  Pain Management: oxy given x 1   Bowel/Bladder: continent, no BM yet  Drains: PIV  Wounds/incisions: NA  Diet: NPO at midnight  Number of times OUT OF BED this shift: 1, SB/Ind ( in room helping her)  Tests/Procedures: NA  Anticipated  DC Date: pending  Significant Information: GI to see tomorrow. Need stool sample to r/o C diff. Nausea and abd pain after eating, zofran and oxy given.

## 2025-04-23 NOTE — PHARMACY-ADMISSION MEDICATION HISTORY
Pharmacist Admission Medication History    Admission medication history is complete. The information provided in this note is only as accurate as the sources available at the time of the update.    Information Source(s): Patient via in-person    Pertinent Information: None    Changes made to PTA medication list:  Added: None  Deleted: Norco, Metoprolol  Changed: Not currently taking Clobetasol external soln and Ketoconazole shampoo.     Allergies reviewed with patient and updates made in EHR: yes    Medication History Completed By: Jade Millard RPH 4/22/2025 8:40 PM    PTA Med List   Medication Sig Last Dose/Taking    ibuprofen (ADVIL/MOTRIN) 800 MG tablet Take 600-800 mg by mouth every 8 hours as needed for moderate pain. 4/21/2025 Evening

## 2025-04-23 NOTE — PROGRESS NOTES
RECEIVING UNIT ED HANDOFF REVIEW    ED Nurse Handoff Report was reviewed by: Latha Bauer RN on April 22, 2025 at 8:50 PM

## 2025-04-23 NOTE — PROGRESS NOTES
[facility]  Hospitalist Progress Note   04/23/2025          Assessment and Plan:       Sharon Barrera is a 30 year old female with medical history of PVCs, depression, PTSD, anxiety, psoriatic arthritis admitted on 4/22/2025 for diarrhea, blood in stools, weight loss.     Diarrhea with intermittent rectal bleeding.  Acute on chronic anemia  Unintentional weight loss.  -Patient presented to the hospital with chronic symptoms and subacute flare with diarrhea, 6-7 loose watery stools with intermittent blood.  Reports progressive fatigue, lightheadedness, dizziness ongoing.  Reports unintentional weight loss of more than 25 pounds over the last 5 weeks.  *seen in [facility] clinic on 4/17. Had negative serum test for celiac disease.   *most recent hgb from 7/15/24 was 12.  Hemoglobin 9.4 on admission.  Creatinine 0.70.  Liver enzymes within normal limits.  Lipase 26.  ESR 10.  CRP less than 3.  hCG serum qualitative negative.  *CT GI bleed- . No active bleeding into the gastrointestinal system.   --Continue monitoring and observation status.  Patient has not had any blood in the stools, bowel movement overnight and unable to collect stool studies.  No nausea or vomiting.  Continues to complain of dizziness, generalized weakness.  --Noted drop in hemoglobin to 8.3 likely dilutional, received IV fluids.  No active bleeding.  --Monitor hemoglobin levels in a.m. or earlier if symptomatic.  Plan to transfuse for hemoglobin less than 7   -Continue isolation precautions, stool for enteric viral panel, C. difficile if able to collect.  - check fecal calprotectin.  -Discussed with [facility], plan for EGD/colonoscopy in AM.  - Will start on clear liquid diet.  N.p.o. from midnight.  IV followed by oral PPI twice daily.  IV/p.o. as needed Zofran.  IV/p.o. as needed pain meds.    Presyncope.  History of PVCs  Patient continues to complain of palpitations, dizziness.    Address medical issues as  discussed  Telemetry monitoring.  Follow TSH, magnesium levels.  Fall precautions.     Severe iron deficiency. Microcytosis.  Iron saturation index 5.  -IV Venofer for 2 doses.  Oral iron supplements on discharge     Uterine fibroid  CT of the abdomen showed fluid in the endometrial canal with an 18 mm x 15 mm x 10 mm soft tissue focus along the endometrium.  -US pelvis transabdominal CT finding corresponds to a 2.3 cm submucosal fibroid in the fundus with broad indentation of the endometrium. This may be the same lesion described on pelvic ultrasound report 7/5/2023, although the images are not available for direct comparison. Normal endometrial thickness. Normal ovaries.  GYN evaluation as outpatient     PTSD  Depression/anxiety  Previously on methadone maintenance therapy has been off over 5 years  -Currently not on any medication    Clinically Significant Risk Factors Present on Admission          # Hyperchloremia: Highest Cl = 111 mmol/L in last 2 days, will monitor as appropriate      # Hypocalcemia: Lowest Ca = 8.3 mg/dL in last 2 days, will monitor and replace as appropriate              # Anemia: based on hgb <11  # Anemia: based on hgb <11      Orders Placed This Encounter      NPO for Medical/Clinical Reasons Except for: Ice Chips, Meds      DVT Prophylaxis: SCDs, ambulate.  Code Status: Full Code  Disposition: Expected discharge in 1 to 2 days pending workup, clinical improvement    Medically Ready for Discharge: Anticipated Tomorrow    Discussed with patient, her  by the bedside, bedside RN, gastroenterologist.    Total time greater than 51 minutes More than 70% of time spent in direct patient care, care coordination, patient counseling, and formalizing plan of care.      Opal Mitchell MD        Interval History:        Patient lying in bed.  Reports nausea.  Some diffuse abdominal pain.  Reports no diarrhea since hospitalization.  Reports generalized weakness, lightheadedness with minimal  ambulation.  Reports intermittent fevers, afebrile since hospitalization.  Tmax 99.3.  Reporting palpitations, history of PVCs.  Denies any chest pain.  Reports some mild headache.  Has been n.p.o. this morning.  No shortness of breath at rest.  Denies any new tingling or numbness.  Denies any fall prior to admission.  Denies any excess alcohol intake.  Patient expressing concern regarding unintentional weight loss -not having answers.           Physical Exam:        Physical Exam   Temp:  [98.1  F (36.7  C)-99.3  F (37.4  C)] 98.1  F (36.7  C)  Pulse:  [51-64] 51  Resp:  [16-18] 17  BP: ()/(50-71) 112/59  SpO2:  [98 %-100 %] 98 %    Intake/Output Summary (Last 24 hours) at 4/23/2025 0849  Last data filed at 4/23/2025 0800  Gross per 24 hour   Intake 1785 ml   Output 500 ml   Net 1285 ml     PHYSICAL EXAM  GENERAL: Patient is in no distress. Alert and oriented.  HEENT: Oropharynx pink, moist.   HEART: Regular rate and rhythm. S1S2. No murmurs  LUNGS: Clear to auscultation bilaterally. No expiratory wheeze.  Respirations unlabored  ABDOMEN: Soft, diffuse abdominal tenderness, bowel sounds heard  NEURO: Moving all extremities  EXTREMITIES: No pedal edema.  SKIN: Warm, dry. No rasH  PSYCHIATRY Cooperative       Medications:        Current Facility-Administered Medications   Medication Dose Route Frequency Provider Last Rate Last Admin    sodium chloride (PF) 0.9% PF flush 3 mL  3 mL Intracatheter Q8H Cosme Taylor MD   3 mL at 04/22/25 2240     Current Facility-Administered Medications   Medication Dose Route Frequency Provider Last Rate Last Admin    acetaminophen (TYLENOL) tablet 650 mg  650 mg Oral Q4H PRN Cosme Taylor MD        Or    acetaminophen (TYLENOL) Suppository 650 mg  650 mg Rectal Q4H PRN Cosme Taylor MD        lidocaine (LMX4) cream   Topical Q1H PRN Cosme Taylor MD        lidocaine 1 % 0.1-1 mL  0.1-1 mL Other Q1H PRN Cosme Taylor MD        naloxone  (NARCAN) injection 0.2 mg  0.2 mg Intravenous Q2 Min PRCosme Clifford MD        Or    naloxone (NARCAN) injection 0.4 mg  0.4 mg Intravenous Q2 Min PRN Cosme Taylor MD        Or    naloxone (NARCAN) injection 0.2 mg  0.2 mg Intramuscular Q2 Min PRN Cosme Taylor MD        Or    naloxone (NARCAN) injection 0.4 mg  0.4 mg Intramuscular Q2 Min PRCosme Clifford MD        ondansetron (ZOFRAN ODT) ODT tab 4 mg  4 mg Oral Q6H PRN Cosme Taylor MD   4 mg at 04/22/25 2210    Or    ondansetron (ZOFRAN) injection 4 mg  4 mg Intravenous Q6H Cosme Patel MD        oxyCODONE (ROXICODONE) tablet 5 mg  5 mg Oral Q6H PRN Cosme Taylor MD        oxyCODONE IR (ROXICODONE) half-tab 2.5 mg  2.5 mg Oral Q6H PRCosme Clifford MD   2.5 mg at 04/23/25 0758    prochlorperazine (COMPAZINE) injection 10 mg  10 mg Intravenous Q6H PRCosme Clifford MD        Or    prochlorperazine (COMPAZINE) tablet 10 mg  10 mg Oral Q6H PRCosme Clifford MD   10 mg at 04/23/25 0806    senna-docusate (SENOKOT-S/PERICOLACE) 8.6-50 MG per tablet 1 tablet  1 tablet Oral BID Cosme Patel MD        Or    senna-docusate (SENOKOT-S/PERICOLACE) 8.6-50 MG per tablet 2 tablet  2 tablet Oral BID Cosme Patel MD        simethicone (MYLICON) suspension 40 mg  40 mg Oral Q6H PRCosme Clifford MD        sodium chloride (PF) 0.9% PF flush 3 mL  3 mL Intracatheter q1 min Cosme Patel MD                Data:      All new lab and imaging data was reviewed.

## 2025-04-23 NOTE — PLAN OF CARE
Goal Outcome Evaluation:      Plan of Care Reviewed With: patient    Overall Patient Progress: improvingOverall Patient Progress: improving    A&Ox4. VSS on RA, soft BP. NPO @midnight. No pain medication given this shift. Pt stated want to sleep through the night. PIV infusing NS @100ml/hr. Up SBA.  in room. Void adequately. Needs stool sample, r/o C-diff. Hypo BS, no BM this shift. GI consulted. Plan for further evaluation today.

## 2025-04-23 NOTE — CONSULTS
Consult acknowledged. Writer sent request to UR department as requested.     Flor Hodge RN   [facility]   Phone [phone], Vocera or [phone]
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
## 2025-04-23 NOTE — H&P
[facility]    History and Physical - Hospitalist Service       Date of Admission:  4/22/2025    Assessment & Plan      Sharon MACK Barrera is a 30 year old female with past medical history of PVCs, depression, PTSD, anxiety, psoriatic arthritis who is being admitted under observation for anemia.         Diarrhea, chronic  Abdominal pain  Microcytic anemia, likely subacute  Wt loss  Intermittent fever/chills >1 year  Presyncope    *Presenting for evaluation of diarrhea for > 6 months, 1 episode of bright red blood 2 weeks ago, has since been dark and intermittently brown. Wt loss over the last 5 weeks. Intermittent fever and chills over a year period. Abdominal pain. Mainly presented today for near syncopal episode.   *seen in [facility] clinic on 4/17. Has negative serum test for celiac disease. Scheduled for more stool studies for tomorrow  *most recent hgb from 7/15/24 was 12. Hgb 8.9 today through care everywhere and 9.4 on admission.   *CT GI bleed- . No active bleeding into the gastrointestinal system.   *given her constellation of symptoms concern for inflammatory bowel disease, however will also need to r/o potential infection    - check fecal calprotectin, enteric and viral panel, c.diff  - check iron studies, ferritin  - IVF overnight  - regular diet now, NPO past [place]  - will avoid NSAIDs. Prn tylenol available, however patient states it does not work.  Oxycodone 2.5 to 5 mg every 6 hours as needed order while in the hospital with no plan for outpatient prescription, patient does understand that and does not want prescription either  - [facility] consult    Uterine fibroid  CT of the abdomen showed fluid in the endometrial canal with an 18 mm x 15 mm x 10 mm soft tissue focus along the endometrium. Recommend further evaluation, perhaps with pelvic ultrasound, sonohysterogram, and/or MRI.  Follow-up ultrasound CT finding corresponds to a 2.3 cm submucosal fibroid in the fundus with broad  indentation of the endometrium. This may be the same lesion described on pelvic ultrasound report 7/5/2023, although the images are not available for direct comparison.       PTSD  Depression/anxiety  Previously on methadone maintenance therapy has been off over 5 years  -Currently not on any medication          Diet:  Regular diet now, n.p.o. after midnight  DVT Prophylaxis: Ambulate every shift  Hodgson Catheter: Not present  Lines: None     Cardiac Monitoring: None  Code Status:  Full code, discussed with patient    Clinically Significant Risk Factors Present on Admission                        # Anemia: based on hgb <11  # Anemia: based on hgb <11                  Disposition Plan     Medically Ready for Discharge: Anticipated Tomorrow           Cosme Taylor MD  Hospitalist Service  [facility]  Securely message with [employer] (more info)  Text page via [employer] Paging/Directory     ______________________________________________________________________    Chief Complaint       History is obtained from the patient    History of Present Illness   Sharon Barrera is a 30 year old female with past medical history of PVCs, depression, PTSD, anxiety, psoriatic arthritis who is presenting for evaluation of diarrhea. Patient reports she has been having loose/watery BM for about 6 months. States 5-6 BM/day.  States 2 weeks ago she was seen at  and was referred to GI. Day after UC visit, she reports having rectal pain followed by blood BM. She did not go back to the UC, however was able to get GI appointment. She reports she was seen at [facility] clinic on 4/17 and hemoglobin and Tissue glutaminase level were checked which came back negative. She is supposed to get some stool studies tomorrow, but came to the ED today because feeling overall weak and near syncopal while walking up stairs.   Reports 26Ibs wt lost in the last 5 weeks.   Has not had bloody stools since that episode she had 2 weeks ago.  However, continues to have loose/watery stool that are at times dark and other times brown.   She is able to keep PO down with emesis. However, does endorse nausea. She also endorses abdominal pain.  Reports intermittent fever/chills over the last year with Tmax of 101 deg F, otherwise staying around 99 deg F.   Denies new rashes, other than know scalp psoriasis  Does not know her dad. Does not know much family history in her mother's side.       In the ED, temperature is 99.1, pulse is 64, blood pressure 112/71.  Laboratory shows normal basic metabolic panel, normal LFTs.  Lipase is 26.  CRP less than 3.  WBC 6.1.  Hemoglobin 9.4 and platelets of 470.  CT of the abdomen was obtained which was negative for active bleeding into the GI system.  There is fluid in the endometrial cannel with an 18 x 15 x 10 mm soft tissue focus along the endometrium and ultrasound recommended for further evaluation.  Given diarrhea, generalized weakness, admission for observation requested.       Past Medical History    Past Medical History:   Diagnosis Date    Anxiety     Depressive disorder     post partum    Nondependent opioid abuse, continuous (H) 08/08/2016    Palpitations     Papanicolaou smear of cervix with low grade squamous intraepithelial lesion (LGSIL) 12/14/2016    PTSD (post-traumatic stress disorder)     [place] survivor    Shortness of breath     Syncope        Past Surgical History   Past Surgical History:   Procedure Laterality Date    APPENDECTOMY         Prior to Admission Medications   Prior to Admission Medications   Prescriptions Last Dose Informant Patient Reported? Taking?   HYDROcodone-acetaminophen (NORCO) 5-325 MG tablet   Yes Yes   Sig: Take 1-2 tablets by mouth.   clobetasol (TEMOVATE) 0.05 % external solution   Yes No   Sig: Apply topically every 48 hours   ibuprofen (ADVIL/MOTRIN) 800 MG tablet   Yes No   Sig: take 1 tablet by mouth every 6 to 8 hours as needed for pain   ketoconazole (NIZORAL)  2 % external shampoo   Yes No   Sig: Apply topically every 48 hours   metoprolol tartrate (LOPRESSOR) 25 MG tablet   Yes No   Sig: Take 12.5 mg by mouth   valACYclovir (VALTREX) 500 MG tablet   Yes Yes      Facility-Administered Medications: None        Review of Systems    Review of Systems as noted in the HPI     Physical Exam   Vital Signs: Temp: 99.1  F (37.3  C) Temp src: Temporal BP: 112/71 Pulse: 64   Resp: 18 SpO2: 100 %      Weight: 0 lbs 0 oz    General Appearance: alert, awake and no apparent distress  HEENT: NCAT, oral mucosa is moist, no pharyngeal erythema or exudate  Respiratory: clear to auscultation bilaterally, no wheezing  Cardiovascular: regular rate and rhythm  GI: soft and tenderness in the lower abdomen, no rebound or guarding  Skin: warm and dry  Musculoskeletal: normal muscle tone  Neurologic: alert, oriented, moves all extermites  Psychiatric: mood and affect are normal    Medical Decision Making       MANAGEMENT DISCUSSED with the following over the past 24 hours: patient   NOTE(S)/MEDICAL RECORDS REVIEWED over the past 24 hours: notes in care everywhere  Tests ORDERED & REVIEWED in the past 24 hours:  - BMP  - CMP  - CBC  Tests personally interpreted in the past 24 hours:          Data     I have personally reviewed the following data over the past 24 hrs:    6.1  \   9.4 (L)   / 470 (H)     143 106 7.3 /  83   4.2 25 0.70 \     ALT: 16 AST: 16 AP: 51 TBILI: 0.6   ALB: 4.5 TOT PROTEIN: 7.2 LIPASE: 26     Procal: N/A CRP: <3.00 Lactic Acid: N/A         Imaging results reviewed over the past 24 hrs:   Recent Results (from the past 24 hours)   CTA GI Bleed    Narrative    EXAM: CTA GI BLEED  LOCATION: [facility]  DATE: 4/22/2025    INDICATION: abdominal pain, bloody stool  COMPARISON: 10/25/2023 and 3/17/2023  TECHNIQUE: CT angiogram abdomen pelvis during arterial phase of injection of IV contrast. 2D and 3D MIP reconstructions were performed by the CT technologist.  Dose reduction techniques were used.  CONTRAST: 82mL isovue 370    FINDINGS:  ANGIOGRAM ABDOMEN/PELVIS: No active bleeding into the gastrointestinal system.    LOWER CHEST: Normal.    HEPATOBILIARY: Normal.    PANCREAS: Normal.    SPLEEN: Normal.    ADRENAL GLANDS: Normal.    KIDNEYS/BLADDER: Normal.    BOWEL: Normal.    LYMPH NODES: Normal.    PELVIC ORGANS: There is fluid in the endometrial canal with an 18 mm x 15 mm x 10 mm soft tissue focus along the endometrium. There is trace free fluid in the pelvis.    MUSCULOSKELETAL: Normal.      Impression    IMPRESSION:  1.  No active bleeding into the gastrointestinal system.  2.  Fluid in the endometrial canal with an 18 mm x 15 mm x 10 mm soft tissue focus along the endometrium. Recommend further evaluation, perhaps with pelvic ultrasound, sonohysterogram, and/or MRI.     US Pelvic Complete w Transvaginal    Narrative    EXAM: US PELVIC TRANSABDOMINAL AND TRANSVAGINAL  LOCATION: [facility]  DATE: 4/22/2025    INDICATION: Pelvic pain, endometrial abnormality seen on CT  COMPARISON: CT 4/22/2025, ultrasound 3/17/2023  TECHNIQUE: Transabdominal scans were performed. Endovaginal ultrasound was performed to better visualize the adnexa.    FINDINGS:    UTERUS: 9.5 x 5.8 x 4.3 cm. 2.3 x 1.8 x 1.7 cm hypoechoic fibroid in the fundus anteriorly with broad indentation of the endometrium.    ENDOMETRIUM: 10 mm. Normal smooth endometrium.    RIGHT OVARY: 2.2 x 3.8 x 2.0 cm. Normal.    LEFT OVARY: 3.0 x 2.9 x 1.9 cm. Normal.    Mild amount of free fluid.      Impression    IMPRESSION:  1.  CT finding corresponds to a 2.3 cm submucosal fibroid in the fundus with broad indentation of the endometrium. This may be the same lesion described on pelvic ultrasound report 7/5/2023, although the images are not available for direct comparison.  2.  Normal endometrial thickness.  3.  Normal ovaries.

## 2025-04-23 NOTE — ED NOTES
[facility]  ED Nurse Handoff Report    ED Chief complaint: Rectal Bleeding      ED Diagnosis:   Final diagnoses:   Lower GI bleeding   Generalized abdominal pain       Code Status: To be addressed by admitting team    Allergies:   Allergies   Allergen Reactions    Doxycycline Unknown    Sertraline Other (See Comments)     Suicidal ideation    Wellbutrin [Bupropion] Other (See Comments)     seizure       Patient Story: Patient BIBA from UC with rectal bleeding and low hgb. Patient has had diarrhea for several months, low PO intake the last few days. Zofran given en route.              Focused Assessment:   Abnormal Labs Resulted from Time of ED Arrival to Time of ED Departure   CBC WITH PLATELETS AND DIFFERENTIAL - Abnormal       Result Value    WBC Count 6.1      RBC Count 4.16      Hemoglobin 9.4 (*)     Hematocrit 31.1 (*)     MCV 75 (*)     MCH 22.6 (*)     MCHC 30.2 (*)     RDW 16.3 (*)     Platelet Count 470 (*)     % Neutrophils 50      % Lymphocytes 43      % Monocytes 6      % Eosinophils 0      % Basophils 1      % Immature Granulocytes 0      NRBCs per 100 WBC 0      Absolute Neutrophils 3.1      Absolute Lymphocytes 2.6      Absolute Monocytes 0.3      Absolute Eosinophils 0.0      Absolute Basophils 0.1      Absolute Immature Granulocytes 0.0      Absolute NRBCs 0.0         CTA GI Bleed   Final Result   IMPRESSION:   1.  No active bleeding into the gastrointestinal system.   2.  Fluid in the endometrial canal with an 18 mm x 15 mm x 10 mm soft tissue focus along the endometrium. Recommend further evaluation, perhaps with pelvic ultrasound, sonohysterogram, and/or MRI.         US Pelvic Complete w Transvaginal    (Results Pending)       Treatments and/or interventions provided:   Medications   ondansetron (ZOFRAN) injection 4 mg (4 mg Intravenous $Given 4/22/25 0440)   pantoprazole (PROTONIX) IV push injection 40 mg (has no administration in time range)   sodium chloride 0.9% BOLUS 1,000  mL (0 mLs Intravenous Stopped 4/22/25 1854)   alum & mag hydroxide-simethicone (MAALOX) suspension 30 mL (30 mLs Oral $Given 4/22/25 1729)   acetaminophen (TYLENOL) tablet 1,000 mg (1,000 mg Oral $Given 4/22/25 1728)   iopamidol (ISOVUE-370) solution 66 mL (82 mLs Intravenous $Given 4/22/25 1825)   sodium chloride 0.9 % bag for CT scan flush (71 mLs Intravenous $Given 4/22/25 1826)       Patient's response to treatments and/or interventions: Improving    To be done/followed up on inpatient unit:  Follow Plan of Care    Does this patient have any cognitive concerns?: A&Ox4    Activity level - Baseline/Home:  Independent  Activity Level - Current:   Independent    Patient's Preferred language: English   Needed?: No    Isolation: Rule out C-Diff  Infection: C-Diff Pending  Patient tested for COVID 19 prior to admission: NO  Bariatric?: No    Vital Signs:   Vitals:    04/22/25 1654   BP: 112/71   Pulse: 64   Resp: 18   Temp: 99.1  F (37.3  C)   TempSrc: Temporal   SpO2: 100%       Cardiac Rhythm:     Was the PSS-3 completed:   Yes  What interventions are required if any?               Family Comments: Family at Bedside  OBS brochure/video discussed/provided to patient/family: N/A    For the majority of the shift this patient's behavior was Green.   Behavioral interventions performed were None.    ED NURSE PHONE NUMBER: [phone]

## 2025-04-24 ENCOUNTER — ANESTHESIA EVENT (OUTPATIENT)
Dept: GASTROENTEROLOGY | Facility: CLINIC | Age: 30
End: 2025-04-24
Payer: COMMERCIAL

## 2025-04-24 ENCOUNTER — ANESTHESIA (OUTPATIENT)
Dept: GASTROENTEROLOGY | Facility: CLINIC | Age: 30
End: 2025-04-24
Payer: COMMERCIAL

## 2025-04-24 LAB
ADV 40+41 DNA STL QL NAA+NON-PROBE: NEGATIVE
ANION GAP SERPL CALCULATED.3IONS-SCNC: 15 MMOL/L (ref 7–15)
ASTRO TYP 1-8 RNA STL QL NAA+NON-PROBE: NEGATIVE
ATRIAL RATE - MUSE: 59 BPM
BUN SERPL-MCNC: 5.3 MG/DL (ref 6–20)
C CAYETANENSIS DNA STL QL NAA+NON-PROBE: NEGATIVE
C DIFF TOX B STL QL: NEGATIVE
CALCIUM SERPL-MCNC: 8.7 MG/DL (ref 8.8–10.4)
CAMPYLOBACTER DNA SPEC NAA+PROBE: NEGATIVE
CHLORIDE SERPL-SCNC: 104 MMOL/L (ref 98–107)
COLONOSCOPY: NORMAL
CREAT SERPL-MCNC: 0.72 MG/DL (ref 0.51–0.95)
CRYPTOSP DNA STL QL NAA+NON-PROBE: NEGATIVE
DIASTOLIC BLOOD PRESSURE - MUSE: NORMAL MMHG
E COLI O157 DNA STL QL NAA+NON-PROBE: ABNORMAL
E HISTOLYT DNA STL QL NAA+NON-PROBE: NEGATIVE
EAEC ASTA GENE ISLT QL NAA+PROBE: NEGATIVE
EC STX1+STX2 GENES STL QL NAA+NON-PROBE: NEGATIVE
EGFRCR SERPLBLD CKD-EPI 2021: >90 ML/MIN/1.73M2
EPEC EAE GENE STL QL NAA+NON-PROBE: NEGATIVE
ETEC LTA+ST1A+ST1B TOX ST NAA+NON-PROBE: NEGATIVE
G LAMBLIA DNA STL QL NAA+NON-PROBE: NEGATIVE
GLUCOSE SERPL-MCNC: 83 MG/DL (ref 70–99)
HCO3 SERPL-SCNC: 20 MMOL/L (ref 22–29)
HGB BLD-MCNC: 8.1 G/DL (ref 11.7–15.7)
INTERPRETATION ECG - MUSE: NORMAL
NOROVIRUS GI+II RNA STL QL NAA+NON-PROBE: POSITIVE
P AXIS - MUSE: 61 DEGREES
P SHIGELLOIDES DNA STL QL NAA+NON-PROBE: NEGATIVE
POTASSIUM SERPL-SCNC: 4 MMOL/L (ref 3.4–5.3)
PR INTERVAL - MUSE: 132 MS
QRS DURATION - MUSE: 86 MS
QT - MUSE: 426 MS
QTC - MUSE: 421 MS
R AXIS - MUSE: 76 DEGREES
RVA RNA STL QL NAA+NON-PROBE: NEGATIVE
SALMONELLA SP RPOD STL QL NAA+PROBE: NEGATIVE
SAPO I+II+IV+V RNA STL QL NAA+NON-PROBE: NEGATIVE
SHIGELLA SP+EIEC IPAH ST NAA+NON-PROBE: NEGATIVE
SODIUM SERPL-SCNC: 139 MMOL/L (ref 135–145)
SYSTOLIC BLOOD PRESSURE - MUSE: NORMAL MMHG
T AXIS - MUSE: 60 DEGREES
UPPER GI ENDOSCOPY: NORMAL
V CHOLERAE DNA SPEC QL NAA+PROBE: NEGATIVE
VENTRICULAR RATE- MUSE: 59 BPM
VIBRIO DNA SPEC NAA+PROBE: NEGATIVE
Y ENTEROCOL DNA STL QL NAA+PROBE: NEGATIVE

## 2025-04-24 PROCEDURE — 0DB98ZX EXCISION OF DUODENUM, VIA NATURAL OR ARTIFICIAL OPENING ENDOSCOPIC, DIAGNOSTIC: ICD-10-PCS | Performed by: INTERNAL MEDICINE

## 2025-04-24 PROCEDURE — 250N000025 HC SEVOFLURANE, PER MIN: Performed by: INTERNAL MEDICINE

## 2025-04-24 PROCEDURE — 250N000011 HC RX IP 250 OP 636: Performed by: INTERNAL MEDICINE

## 2025-04-24 PROCEDURE — 88305 TISSUE EXAM BY PATHOLOGIST: CPT | Mod: TC | Performed by: INTERNAL MEDICINE

## 2025-04-24 PROCEDURE — 370N000017 HC ANESTHESIA TECHNICAL FEE, PER MIN: Performed by: INTERNAL MEDICINE

## 2025-04-24 PROCEDURE — 0DBE8ZX EXCISION OF LARGE INTESTINE, VIA NATURAL OR ARTIFICIAL OPENING ENDOSCOPIC, DIAGNOSTIC: ICD-10-PCS | Performed by: INTERNAL MEDICINE

## 2025-04-24 PROCEDURE — 45331 SIGMOIDOSCOPY AND BIOPSY: CPT | Performed by: INTERNAL MEDICINE

## 2025-04-24 PROCEDURE — 0DB68ZX EXCISION OF STOMACH, VIA NATURAL OR ARTIFICIAL OPENING ENDOSCOPIC, DIAGNOSTIC: ICD-10-PCS | Performed by: INTERNAL MEDICINE

## 2025-04-24 PROCEDURE — 250N000009 HC RX 250: Performed by: NURSE ANESTHETIST, CERTIFIED REGISTERED

## 2025-04-24 PROCEDURE — 120N000001 HC R&B MED SURG/OB

## 2025-04-24 PROCEDURE — 99232 SBSQ HOSP IP/OBS MODERATE 35: CPT | Performed by: HOSPITALIST

## 2025-04-24 PROCEDURE — 250N000011 HC RX IP 250 OP 636: Performed by: NURSE ANESTHETIST, CERTIFIED REGISTERED

## 2025-04-24 PROCEDURE — 88305 TISSUE EXAM BY PATHOLOGIST: CPT | Mod: 26 | Performed by: PATHOLOGY

## 2025-04-24 PROCEDURE — 36415 COLL VENOUS BLD VENIPUNCTURE: CPT | Performed by: HOSPITALIST

## 2025-04-24 PROCEDURE — 85018 HEMOGLOBIN: CPT | Performed by: HOSPITALIST

## 2025-04-24 PROCEDURE — G0378 HOSPITAL OBSERVATION PER HR: HCPCS

## 2025-04-24 PROCEDURE — 80051 ELECTROLYTE PANEL: CPT | Performed by: HOSPITALIST

## 2025-04-24 PROCEDURE — 999N000010 HC STATISTIC ANES STAT CODE-CRNA PER MINUTE: Performed by: INTERNAL MEDICINE

## 2025-04-24 PROCEDURE — 250N000013 HC RX MED GY IP 250 OP 250 PS 637: Performed by: INTERNAL MEDICINE

## 2025-04-24 PROCEDURE — 250N000013 HC RX MED GY IP 250 OP 250 PS 637: Performed by: HOSPITALIST

## 2025-04-24 PROCEDURE — 43239 EGD BIOPSY SINGLE/MULTIPLE: CPT | Performed by: INTERNAL MEDICINE

## 2025-04-24 PROCEDURE — 45380 COLONOSCOPY AND BIOPSY: CPT | Performed by: INTERNAL MEDICINE

## 2025-04-24 PROCEDURE — 258N000003 HC RX IP 258 OP 636: Performed by: NURSE ANESTHETIST, CERTIFIED REGISTERED

## 2025-04-24 RX ORDER — HYDROMORPHONE HCL IN WATER/PF 6 MG/30 ML
0.4 PATIENT CONTROLLED ANALGESIA SYRINGE INTRAVENOUS EVERY 5 MIN PRN
Status: DISCONTINUED | OUTPATIENT
Start: 2025-04-24 | End: 2025-04-24 | Stop reason: HOSPADM

## 2025-04-24 RX ORDER — FENTANYL CITRATE 50 UG/ML
50 INJECTION, SOLUTION INTRAMUSCULAR; INTRAVENOUS EVERY 5 MIN PRN
Status: DISCONTINUED | OUTPATIENT
Start: 2025-04-24 | End: 2025-04-24 | Stop reason: HOSPADM

## 2025-04-24 RX ORDER — SODIUM CHLORIDE, SODIUM LACTATE, POTASSIUM CHLORIDE, CALCIUM CHLORIDE 600; 310; 30; 20 MG/100ML; MG/100ML; MG/100ML; MG/100ML
INJECTION, SOLUTION INTRAVENOUS CONTINUOUS PRN
Status: DISCONTINUED | OUTPATIENT
Start: 2025-04-24 | End: 2025-04-24

## 2025-04-24 RX ORDER — FENTANYL CITRATE 50 UG/ML
25 INJECTION, SOLUTION INTRAMUSCULAR; INTRAVENOUS EVERY 5 MIN PRN
Status: DISCONTINUED | OUTPATIENT
Start: 2025-04-24 | End: 2025-04-24 | Stop reason: HOSPADM

## 2025-04-24 RX ORDER — DEXAMETHASONE SODIUM PHOSPHATE 4 MG/ML
INJECTION, SOLUTION INTRA-ARTICULAR; INTRALESIONAL; INTRAMUSCULAR; INTRAVENOUS; SOFT TISSUE PRN
Status: DISCONTINUED | OUTPATIENT
Start: 2025-04-24 | End: 2025-04-24

## 2025-04-24 RX ORDER — LIDOCAINE HYDROCHLORIDE 20 MG/ML
INJECTION, SOLUTION INFILTRATION; PERINEURAL PRN
Status: DISCONTINUED | OUTPATIENT
Start: 2025-04-24 | End: 2025-04-24

## 2025-04-24 RX ORDER — PROPOFOL 10 MG/ML
INJECTION, EMULSION INTRAVENOUS PRN
Status: DISCONTINUED | OUTPATIENT
Start: 2025-04-24 | End: 2025-04-24

## 2025-04-24 RX ORDER — PROPOFOL 10 MG/ML
INJECTION, EMULSION INTRAVENOUS CONTINUOUS PRN
Status: DISCONTINUED | OUTPATIENT
Start: 2025-04-24 | End: 2025-04-24

## 2025-04-24 RX ORDER — HYDROMORPHONE HCL IN WATER/PF 6 MG/30 ML
0.2 PATIENT CONTROLLED ANALGESIA SYRINGE INTRAVENOUS EVERY 5 MIN PRN
Status: DISCONTINUED | OUTPATIENT
Start: 2025-04-24 | End: 2025-04-24 | Stop reason: HOSPADM

## 2025-04-24 RX ORDER — SODIUM CHLORIDE, SODIUM LACTATE, POTASSIUM CHLORIDE, CALCIUM CHLORIDE 600; 310; 30; 20 MG/100ML; MG/100ML; MG/100ML; MG/100ML
INJECTION, SOLUTION INTRAVENOUS CONTINUOUS
Status: DISCONTINUED | OUTPATIENT
Start: 2025-04-24 | End: 2025-04-24 | Stop reason: HOSPADM

## 2025-04-24 RX ORDER — ONDANSETRON 2 MG/ML
INJECTION INTRAMUSCULAR; INTRAVENOUS PRN
Status: DISCONTINUED | OUTPATIENT
Start: 2025-04-24 | End: 2025-04-24

## 2025-04-24 RX ORDER — NALOXONE HYDROCHLORIDE 0.4 MG/ML
0.1 INJECTION, SOLUTION INTRAMUSCULAR; INTRAVENOUS; SUBCUTANEOUS
Status: DISCONTINUED | OUTPATIENT
Start: 2025-04-24 | End: 2025-04-24 | Stop reason: HOSPADM

## 2025-04-24 RX ADMIN — SUGAMMADEX 200 MG: 100 INJECTION, SOLUTION INTRAVENOUS at 10:29

## 2025-04-24 RX ADMIN — PANTOPRAZOLE SODIUM 40 MG: 40 TABLET, DELAYED RELEASE ORAL at 16:14

## 2025-04-24 RX ADMIN — OXYCODONE HYDROCHLORIDE 5 MG: 5 TABLET ORAL at 00:38

## 2025-04-24 RX ADMIN — PROPOFOL 150 MCG/KG/MIN: 10 INJECTION, EMULSION INTRAVENOUS at 10:05

## 2025-04-24 RX ADMIN — ONDANSETRON 4 MG: 4 TABLET, ORALLY DISINTEGRATING ORAL at 21:40

## 2025-04-24 RX ADMIN — ONDANSETRON 4 MG: 4 TABLET, ORALLY DISINTEGRATING ORAL at 12:29

## 2025-04-24 RX ADMIN — DEXAMETHASONE SODIUM PHOSPHATE 4 MG: 4 INJECTION, SOLUTION INTRA-ARTICULAR; INTRALESIONAL; INTRAMUSCULAR; INTRAVENOUS; SOFT TISSUE at 10:09

## 2025-04-24 RX ADMIN — LIDOCAINE HYDROCHLORIDE 80 MG: 20 INJECTION, SOLUTION INFILTRATION; PERINEURAL at 10:05

## 2025-04-24 RX ADMIN — PROPOFOL 200 MG: 10 INJECTION, EMULSION INTRAVENOUS at 10:05

## 2025-04-24 RX ADMIN — OXYCODONE HYDROCHLORIDE 5 MG: 5 TABLET ORAL at 21:40

## 2025-04-24 RX ADMIN — ONDANSETRON 4 MG: 2 INJECTION INTRAMUSCULAR; INTRAVENOUS at 10:09

## 2025-04-24 RX ADMIN — OXYCODONE HYDROCHLORIDE 5 MG: 5 TABLET ORAL at 14:35

## 2025-04-24 RX ADMIN — MIDAZOLAM 2 MG: 1 INJECTION INTRAMUSCULAR; INTRAVENOUS at 09:58

## 2025-04-24 RX ADMIN — SODIUM CHLORIDE, SODIUM LACTATE, POTASSIUM CHLORIDE, AND CALCIUM CHLORIDE: .6; .31; .03; .02 INJECTION, SOLUTION INTRAVENOUS at 10:01

## 2025-04-24 ASSESSMENT — ACTIVITIES OF DAILY LIVING (ADL)
ADLS_ACUITY_SCORE: 20
ADLS_ACUITY_SCORE: 22
ADLS_ACUITY_SCORE: 20
ADLS_ACUITY_SCORE: 22
ADLS_ACUITY_SCORE: 20
ADLS_ACUITY_SCORE: 22
ADLS_ACUITY_SCORE: 20
ADLS_ACUITY_SCORE: 22
ADLS_ACUITY_SCORE: 20
ADLS_ACUITY_SCORE: 22
ADLS_ACUITY_SCORE: 20

## 2025-04-24 ASSESSMENT — ENCOUNTER SYMPTOMS
SEIZURES: 0
DYSRHYTHMIAS: 1

## 2025-04-24 ASSESSMENT — LIFESTYLE VARIABLES: TOBACCO_USE: 0

## 2025-04-24 NOTE — ANESTHESIA CARE TRANSFER NOTE
Patient: Sharon Barrera    Procedure: Procedure(s):  ESOPHAGOGASTRODUODENOSCOPY, WITH BIOPSY  SIGMOIDOSCOPY, FLEXIBLE, WITH BIOPSY       Diagnosis: Diarrhea [R19.7]  Anemia [D64.9]  Diagnosis Additional Information: No value filed.    Anesthesia Type:   MAC     Note:    Oropharynx: oropharynx clear of all foreign objects  Level of Consciousness: awake  Oxygen Supplementation: face mask  Level of Supplemental Oxygen (L/min / FiO2): 6  Independent Airway: airway patency satisfactory and stable  Dentition: dentition unchanged  Vital Signs Stable: post-procedure vital signs reviewed and stable  Report to RN Given: handoff report given  Patient transferred to: PACU    Handoff Report: Identifed the Patient, Identified the Reponsible Provider, Reviewed the pertinent medical history, Discussed the surgical course, Reviewed Intra-OP anesthesia mangement and issues during anesthesia, Set expectations for post-procedure period and Allowed opportunity for questions and acknowledgement of understanding    Vitals:  Vitals Value Taken Time   /59 04/24/25 1045   Temp     Pulse 85 04/24/25 1046   Resp 16 04/24/25 1046   SpO2 97 % 04/24/25 1046   Vitals shown include unfiled device data.    Electronically Signed By: GUY Ellis CRNA  April 24, 2025  10:47 AM

## 2025-04-24 NOTE — PROGRESS NOTES
Goal Outcome Evaluation:       Plan of Care Reviewed With: patient     Overall Patient Progress: no changeOverall Patient Progress: no change     A&Ox4. VSS on RA. NPO @midnight. C/o abdominal pain, PRN oxy given twicw Pt refused to take a bowel prep, educated provided, pt understood and states makes her more nauseas. PIV SL. Up independently in room, SBA to halls due to pt state light headedness & dizziness.  Void adequately. Hypo BS, no BM this shift. GI consulted. C.diff negative, positive to norovirus. Had EGD/Colonoscopy tele dced in am refused iron protonix to have nurse wanted iv started as she was getting dry and couldn't have water .educated on no order for iv why take iron and no water . Still anxious even with charge nurse talking with her went down for endo and had test upon return stated she was sorry but didn't want iv iron as it made her nauseated might take it later.  came to see and told her she didn't need iron dced it .

## 2025-04-24 NOTE — PROGRESS NOTES
[facility]  Hospitalist Progress Note   04/24/2025          Assessment and Plan:       Sharon Barrera is a 30 year old female with medical history of PVCs, depression, PTSD, anxiety, psoriatic arthritis admitted on 4/22/2025 for diarrhea, blood in stools, weight loss.     Norovirus infection -diarrhea with intermittent rectal bleeding.  Acute on chronic anemia  Unintentional weight loss.  -Patient presented to the hospital with chronic symptoms and subacute flare with diarrhea, 6-7 loose watery stools with intermittent blood.  Reports progressive fatigue, lightheadedness, dizziness ongoing.  Reported unintentional weight loss of more than 25 pounds over the last 5 weeks. Seen in [facility] clinic on 4/17. Had negative serum test for celiac disease.   *Most recent hgb from 7/15/24 was 12.  Hemoglobin 9.4 on admission.  Creatinine 0.70.  Liver enzymes within normal limits.  Lipase 26.  ESR 10.  CRP less than 3.  hCG serum qualitative negative.  *CT GI bleed- . No active bleeding into the gastrointestinal system.   *Norovirus positive on 4/23.  C. difficile negative  *EGD 4/24 normal stomach, normal duodenum, normal esophagus.  *Colonoscopy 4/24 examined portion of the ileum was normal, entire examined colon normal.  Biopsied.  No evidence of ulcerative colitis/ileitis/obvious Crohn's.  -- Patient had undergone EGD, colonoscopy this morning.  Reports has not had anything to eat since yesterday, minimal ambulation.  ---- Will continue symptomatic treatment for norovirus.  Adequate oral hydration.  If ongoing diarrhea or vomiting will initiate IV fluids.  Oral diet, advance as tolerated.  Switch IV to oral PPI  -- Noted drop in hemoglobin to 8.1, likely dilutional.  No active bleeding.  Monitor hemoglobin levels in a.m. or earlier if symptomatic.  Plan to transfuse for hemoglobin less than 7.  Continue enteric precautions  Follow-up with [facility] in clinic in 2 to 3 weeks to review next steps  as no clear explanations for weight loss, anemia.     Severe iron deficiency. Microcytosis.  Iron saturation index 5.  -Patient had received IV Venofer on 4/23, reports unable to tolerate IV iron.  Oral iron supplements on discharge.  Age-appropriate health maintenance, can consider hematology evaluation as outpatient    Presyncope likely from diarrhea.  History of PVCs  Patient continues to complain of palpitations, dizziness.    Address medical issues as discussed  Telemetry monitoring-no report of acute events.    TSH, magnesium, potassium within normal limits.  Fall precautions.  Encourage ambulation.     Uterine fibroid  CT of the abdomen showed fluid in the endometrial canal with an 18 mm x 15 mm x 10 mm soft tissue focus along the endometrium.  -US pelvis transabdominal CT finding corresponds to a 2.3 cm submucosal fibroid in the fundus with broad indentation of the endometrium. This may be the same lesion described on pelvic ultrasound report 7/5/2023, although the images are not available for direct comparison. Normal endometrial thickness. Normal ovaries.  GYN evaluation as outpatient     PTSD  Depression/anxiety  Previously on methadone maintenance therapy has been off over 5 years  -Currently not on any medication    Clinically Significant Risk Factors Present on Admission          # Hyperchloremia: Highest Cl = 111 mmol/L in last 2 days, will monitor as appropriate      # Hypocalcemia: Lowest Ca = 8.3 mg/dL in last 2 days, will monitor and replace as appropriate              # Anemia: based on hgb <11  # Anemia: based on hgb <11  Orders Placed This Encounter      Advance Diet as Tolerated: Clear Liquid Diet; Regular Diet Adult      DVT Prophylaxis: SCDs, ambulate  Code Status: Full Code  Disposition: Expected discharge likely tomorrow pending clinical improvement    Medically Ready for Discharge: Anticipated Tomorrow     Discussed with patient, her  by the bedside, bedside RN, GI team  More than  70% of time spent in direct patient care, care coordination, patient counseling, and formalizing plan of care.      Opal Mitchell MD        Interval History:      Patient lying in bed.  Reports nausea, has not had anything to eat for the last few days.  Had just returned back from EGD, colonoscopy, results discussed with patient.  Diarrhea improving.  Reports generalized weakness, lightheadedness with minimal ambulation.  Afebrile.  Reporting palpitations, history of PVCs.  Per report no acute events on telemetry.    Denies any chest pain.    No shortness of breath at rest.  Denies any new tingling or numbness.  Patient expressing concern regarding bicarb of 20 on blood draw this morning.  Anion gap within normal limits, potassium magnesium within normal limits         Physical Exam:        Physical Exam   Temp:  [96.9  F (36.1  C)-98.8  F (37.1  C)] 98.3  F (36.8  C)  Pulse:  [] 102  Resp:  [15-21] 18  BP: ()/(45-82) 119/64  SpO2:  [97 %-100 %] 100 %    Intake/Output Summary (Last 24 hours) at 4/24/2025 1456  Last data filed at 4/24/2025 1047  Gross per 24 hour   Intake 1120 ml   Output 600 ml   Net 520 ml     PHYSICAL EXAM  GENERAL: Patient is in no distress. Alert and oriented.  HEART: Regular rate and rhythm. S1S2. No murmurs  LUNGS: Clear to auscultation bilaterally. No expiratory wheeze.  Respirations unlabored  ABDOMEN: Soft, diffuse tenderness, bowel sounds heard.  NEURO moving all extremities  EXTREMITIES: No pedal edema.  SKIN: Warm, dry. No rash   PSYCHIATRY Cooperative       Medications:        Current Facility-Administered Medications   Medication Dose Route Frequency Provider Last Rate Last Admin    pantoprazole (PROTONIX) EC tablet 40 mg  40 mg Oral BID AC Opal Mitchell MD        sodium chloride (PF) 0.9% PF flush 3 mL  3 mL Intracatheter Q8H Cosme Taylor MD   3 mL at 04/24/25 1229     Current Facility-Administered Medications   Medication Dose Route Frequency Provider  Last Rate Last Admin    acetaminophen (TYLENOL) tablet 650 mg  650 mg Oral Q4H PRN Cosme Taylor MD        Or    acetaminophen (TYLENOL) Suppository 650 mg  650 mg Rectal Q4H PRN Cosme Taylor MD        lidocaine (LMX4) cream   Topical Q1H PRN Cosme Taylor MD        lidocaine 1 % 0.1-1 mL  0.1-1 mL Other Q1H PRN Cosme Taylor MD        naloxone (NARCAN) injection 0.2 mg  0.2 mg Intravenous Q2 Min PRN Cosme Taylor MD        Or    naloxone (NARCAN) injection 0.4 mg  0.4 mg Intravenous Q2 Min PRCosme Crespo MD        Or    naloxone (NARCAN) injection 0.2 mg  0.2 mg Intramuscular Q2 Min PRN Cosme Taylor MD        Or    naloxone (NARCAN) injection 0.4 mg  0.4 mg Intramuscular Q2 Min PRCosme Crespo MD        ondansetron (ZOFRAN ODT) ODT tab 4 mg  4 mg Oral Q6H PRN Cosme Taylor MD   4 mg at 04/24/25 1229    Or    ondansetron (ZOFRAN) injection 4 mg  4 mg Intravenous Q6H PRCosme Crespo MD        oxyCODONE (ROXICODONE) tablet 5 mg  5 mg Oral Q6H PRN Cosme Taylor MD   5 mg at 04/24/25 1435    oxyCODONE IR (ROXICODONE) half-tab 2.5 mg  2.5 mg Oral Q6H PRN Cosme Taylor MD   2.5 mg at 04/23/25 1737    prochlorperazine (COMPAZINE) injection 10 mg  10 mg Intravenous Q6H PRCosme Crespo MD   10 mg at 04/23/25 2223    Or    prochlorperazine (COMPAZINE) tablet 10 mg  10 mg Oral Q6H PRN Cosme Taylor MD   10 mg at 04/23/25 1614    senna-docusate (SENOKOT-S/PERICOLACE) 8.6-50 MG per tablet 1 tablet  1 tablet Oral BID PRCosme Crespo MD        Or    senna-docusate (SENOKOT-S/PERICOLACE) 8.6-50 MG per tablet 2 tablet  2 tablet Oral BID PRCosme Crespo MD        simethicone (MYLICON) suspension 40 mg  40 mg Oral Q6H PRCosme Crespo MD        sodium chloride (PF) 0.9% PF flush 3 mL  3 mL Intracatheter q1 min prCosme Crespo MD                Data:      All new lab and  imaging data was reviewed.

## 2025-04-24 NOTE — PROGRESS NOTES
Norovirus + status noted- this likely explains acute flare up symptoms and presentation. Does not explain chronic diarrhea and weight loss 6 mth and profound iron deficiency anemia. Pt did not do prep (only 1 cup taken) but reports ongoing diarrhea.  - discussed with pt option to do EGD given anemia and sigmoidoscopy to check for any obvious IBD features. Pt agreeable.     See Procedure reports for findings and next steps.  Sebastián Rodriguez MD  [facility]  Phone [phone]

## 2025-04-24 NOTE — PROVIDER NOTIFICATION
.MD Notification    Notified Person: MD    Notified Person Name: MD:Elicia    Notification Date/Time: 4/24, 0445    Notification Interaction:Vocera web    Purpose of Notification: Lab test result came with positive norovirus    Orders Received:    Comments:

## 2025-04-24 NOTE — ANESTHESIA POSTPROCEDURE EVALUATION
Patient: Sharon Barrera    Procedure: Procedure(s):  ESOPHAGOGASTRODUODENOSCOPY, WITH BIOPSY  SIGMOIDOSCOPY, FLEXIBLE, WITH BIOPSY       Anesthesia Type:  MAC    Note:  Disposition: Inpatient   Postop Pain Control: Uneventful            Sign Out: Well controlled pain   PONV: No   Neuro/Psych: Uneventful            Sign Out: Acceptable/Baseline neuro status   Airway/Respiratory: Uneventful            Sign Out: Acceptable/Baseline resp. status   CV/Hemodynamics: Uneventful            Sign Out: Acceptable CV status; No obvious hypovolemia; No obvious fluid overload   Other NRE: NONE   DID A NON-ROUTINE EVENT OCCUR? No           Last vitals:  Vitals Value Taken Time   /68 04/24/25 1100   Temp 36.1  C (97  F) 04/24/25 1100   Pulse 61 04/24/25 1110   Resp 24 04/24/25 1110   SpO2 97 % 04/24/25 1112   Vitals shown include unfiled device data.    Electronically Signed By: Delvin Daniels MD  April 24, 2025  11:15 AM

## 2025-04-24 NOTE — PLAN OF CARE
Goal Outcome Evaluation:      Plan of Care Reviewed With: patient    Overall Patient Progress: no changeOverall Patient Progress: no change    A&Ox4. VSS on RA. NPO @midnight. C/o abdominal pain, PRN oxy given once. Pt refused to take a bowel prep, educated provided, pt understood and states makes her more nauseas. PIV SL. Up independently in room, SBA to halls due to pt state light headedness & dizziness.  Void adequately. Hypo BS, no BM this shift. GI consulted. C.diff negative, positive to norovirus. Plan for EGD/Colonoscopy today @10am.

## 2025-04-24 NOTE — PLAN OF CARE
Here for diarrhea, weight loss. S/p EGD/colonoscopy done today. A&O x4. VSS on RA. Up independent. Denied pain. Tolerating regular diet for dinner. On enteric precautions for norovirus. Discharge likely in 1-2 days.

## 2025-04-24 NOTE — ANESTHESIA PROCEDURE NOTES
Airway       Patient location during procedure: OR       Procedure Start/Stop Times: 4/24/2025 10:06 AM  Staff -        CRNA: Billy Polanco APRN CRNA       Performed By: CRNA  Consent for Airway        Urgency: elective  Indications and Patient Condition       Indications for airway management: regis-procedural       Induction type:intravenous       Mask difficulty assessment: 0 - not attempted    Final Airway Details       Final airway type: endotracheal airway       Successful airway: ETT - single  Endotracheal Airway Details        ETT size (mm): 7.0       Cuffed: yes       Successful intubation technique: video laryngoscopy       VL Blade Size: Barragan 3       Grade View of Cords: 1       Adjucts: stylet       Bite block used: None    Post intubation assessment        Placement verified by: capnometry, equal breath sounds and chest rise        Number of attempts at approach: 1       Secured with: tape       Ease of procedure: easy       Dentition: Intact and Unchanged    Medication(s) Administered   Medication Administration Time: 4/24/2025 10:06 AM

## 2025-04-24 NOTE — PROGRESS NOTES
[facility]  Hospitalist Progress Note   04/24/2025          Assessment and Plan:       Sharon Barrera is a 30 year old female with medical history of PVCs, depression, PTSD, anxiety, psoriatic arthritis admitted on 4/22/2025 for diarrhea, blood in stools, weight loss.     Diarrhea with intermittent rectal bleeding.  Acute on chronic anemia  Unintentional weight loss.  -Patient presented to the hospital with chronic symptoms and subacute flare with diarrhea, 6-7 loose watery stools with intermittent blood.  Reports progressive fatigue, lightheadedness, dizziness ongoing.  Reports unintentional weight loss of more than 25 pounds over the last 5 weeks.  *seen in [facility] clinic on 4/17. Had negative serum test for celiac disease.   *most recent hgb from 7/15/24 was 12.  Hemoglobin 9.4 on admission.  Creatinine 0.70.  Liver enzymes within normal limits.  Lipase 26.  ESR 10.  CRP less than 3.  hCG serum qualitative negative.  *CT GI bleed- . No active bleeding into the gastrointestinal system.   --Continue monitoring and observation status.  Patient has not had any blood in the stools, bowel movement overnight and unable to collect stool studies.  No nausea or vomiting.  Continues to complain of dizziness, generalized weakness.  --Noted drop in hemoglobin to 8.3 likely dilutional, received IV fluids.  No active bleeding.  --Monitor hemoglobin levels in a.m. or earlier if symptomatic.  Plan to transfuse for hemoglobin less than 7   -Continue isolation precautions, stool for enteric viral panel, C. difficile if able to collect.  - check fecal calprotectin.  -Discussed with [facility], plan for EGD/colonoscopy in AM.  - Will start on clear liquid diet.  N.p.o. from midnight.  IV followed by oral PPI twice daily.  IV/p.o. as needed Zofran.  IV/p.o. as needed pain meds.    Presyncope.  History of PVCs  Patient continues to complain of palpitations, dizziness.    Address medical issues as  discussed  Telemetry monitoring.  Follow TSH, magnesium levels.  Fall precautions.     Severe iron deficiency. Microcytosis.  Iron saturation index 5.  -IV Venofer for 2 doses.  Oral iron supplements on discharge     Uterine fibroid  CT of the abdomen showed fluid in the endometrial canal with an 18 mm x 15 mm x 10 mm soft tissue focus along the endometrium.  -US pelvis transabdominal CT finding corresponds to a 2.3 cm submucosal fibroid in the fundus with broad indentation of the endometrium. This may be the same lesion described on pelvic ultrasound report 7/5/2023, although the images are not available for direct comparison. Normal endometrial thickness. Normal ovaries.  GYN evaluation as outpatient     PTSD  Depression/anxiety  Previously on methadone maintenance therapy has been off over 5 years  -Currently not on any medication    Clinically Significant Risk Factors Present on Admission          # Hyperchloremia: Highest Cl = 111 mmol/L in last 2 days, will monitor as appropriate      # Hypocalcemia: Lowest Ca = 8.3 mg/dL in last 2 days, will monitor and replace as appropriate                # Anemia: based on hgb <11  # Anemia: based on hgb <11      Orders Placed This Encounter      NPO for Procedure/Surgery per Anesthesia Guidelines Except for: Meds; Clear liquids before procedure/surgery: ADULT (Age GREATER than or Equal to 18 years) - Clear liquids 2 hours before procedure/surgery      DVT Prophylaxis: SCDs, ambulate.  Code Status: Full Code  Disposition: Expected discharge in 1 to 2 days pending workup, clinical improvement    Medically Ready for Discharge: Anticipated Tomorrow    Discussed with patient, her  by the bedside, bedside RN, gastroenterologist.    Total time greater than 51 minutes More than 70% of time spent in direct patient care, care coordination, patient counseling, and formalizing plan of care.      Opal Mitchell MD        Interval History:        Patient lying in  bed.  Reports nausea.  Some diffuse abdominal pain.  Reports no diarrhea since hospitalization.  Reports generalized weakness, lightheadedness with minimal ambulation.  Reports intermittent fevers, afebrile since hospitalization.  Tmax 99.3.  Reporting palpitations, history of PVCs.  Denies any chest pain.  Reports some mild headache.  Has been n.p.o. this morning.  No shortness of breath at rest.  Denies any new tingling or numbness.  Denies any fall prior to admission.  Denies any excess alcohol intake.  Patient expressing concern regarding unintentional weight loss -not having answers.           Physical Exam:        Physical Exam   Temp:  [97.6  F (36.4  C)-98.8  F (37.1  C)] 98.8  F (37.1  C)  Pulse:  [] 58  Resp:  [16-18] 18  BP: ()/(50-66) 108/66  SpO2:  [97 %-98 %] 98 %    Intake/Output Summary (Last 24 hours) at 4/23/2025 0849  Last data filed at 4/23/2025 0800  Gross per 24 hour   Intake 1785 ml   Output 500 ml   Net 1285 ml     PHYSICAL EXAM  GENERAL: Patient is in no distress. Alert and oriented.  HEENT: Oropharynx pink, moist.   HEART: Regular rate and rhythm. S1S2. No murmurs  LUNGS: Clear to auscultation bilaterally. No expiratory wheeze.  Respirations unlabored  ABDOMEN: Soft, diffuse abdominal tenderness, bowel sounds heard  NEURO: Moving all extremities  EXTREMITIES: No pedal edema.  SKIN: Warm, dry. No rasH  PSYCHIATRY Cooperative       Medications:        Current Facility-Administered Medications   Medication Dose Route Frequency Provider Last Rate Last Admin    iron sucrose (VENOFER) 300 mg in sodium chloride 0.9 % 290 mL intermittent infusion  300 mg Intravenous Daily Opal Mitchell .3 mL/hr at 04/23/25 1729 300 mg at 04/23/25 1729    pantoprazole (PROTONIX) EC tablet 40 mg  40 mg Oral BID AC Opal Mitchell MD        sodium chloride (PF) 0.9% PF flush 3 mL  3 mL Intracatheter Q8H Cosme Taylor MD   3 mL at 04/24/25 0705     Current Facility-Administered  Medications   Medication Dose Route Frequency Provider Last Rate Last Admin    acetaminophen (TYLENOL) tablet 650 mg  650 mg Oral Q4H PRN Cosme Taylor MD        Or    acetaminophen (TYLENOL) Suppository 650 mg  650 mg Rectal Q4H PRN Cosme Taylor MD        lidocaine (LMX4) cream   Topical Q1H PRN Cosme Taylor MD        lidocaine 1 % 0.1-1 mL  0.1-1 mL Other Q1H PRN Cosme Taylor MD        naloxone (NARCAN) injection 0.2 mg  0.2 mg Intravenous Q2 Min PRN Cosme Taylor MD        Or    naloxone (NARCAN) injection 0.4 mg  0.4 mg Intravenous Q2 Min PRN Cosme Taylor MD        Or    naloxone (NARCAN) injection 0.2 mg  0.2 mg Intramuscular Q2 Min PRN Cosme Taylor MD        Or    naloxone (NARCAN) injection 0.4 mg  0.4 mg Intramuscular Q2 Min PRCosme Crespo MD        ondansetron (ZOFRAN ODT) ODT tab 4 mg  4 mg Oral Q6H PRN Cosme Taylor MD   4 mg at 04/23/25 1944    Or    ondansetron (ZOFRAN) injection 4 mg  4 mg Intravenous Q6H PRCosme Crespo MD        oxyCODONE (ROXICODONE) tablet 5 mg  5 mg Oral Q6H PRN Cosme Taylor MD   5 mg at 04/24/25 0038    oxyCODONE IR (ROXICODONE) half-tab 2.5 mg  2.5 mg Oral Q6H PRCosme Crespo MD   2.5 mg at 04/23/25 1737    prochlorperazine (COMPAZINE) injection 10 mg  10 mg Intravenous Q6H PRN Cosme Taylor MD   10 mg at 04/23/25 2223    Or    prochlorperazine (COMPAZINE) tablet 10 mg  10 mg Oral Q6H PRN Cosme Taylor MD   10 mg at 04/23/25 1614    senna-docusate (SENOKOT-S/PERICOLACE) 8.6-50 MG per tablet 1 tablet  1 tablet Oral BID PRCosme Crespo MD        Or    senna-docusate (SENOKOT-S/PERICOLACE) 8.6-50 MG per tablet 2 tablet  2 tablet Oral BID PRCosme Crespo MD        simethicone (MYLICON) suspension 40 mg  40 mg Oral Q6H PRN Cosme Taylor MD        sodium chloride (PF) 0.9% PF flush 3 mL  3 mL Intracatheter q1 min prCosme Crespo  MD Rachel                Data:      All new lab and imaging data was reviewed.

## 2025-04-24 NOTE — PROVIDER NOTIFICATION
"Dr. Pimenetl notified about patient's increased nausea and low tolerance to Bowel prep. Pt is tearful from the taste and the nausea from the prep. Pt states \"I will refuse it if I have to drink the entire thing.\"   "

## 2025-04-24 NOTE — ANESTHESIA PREPROCEDURE EVALUATION
Anesthesia Pre-Procedure Evaluation    Patient: Sharon Barrera   MRN: [id number] : 1995        Procedure : Procedure(s):  Esophagoscopy, gastroscopy, duodenoscopy (EGD), combined  Colonoscopy          Past Medical History:   Diagnosis Date    Anxiety     Depressive disorder     post partum    Nondependent opioid abuse, continuous (H) 2016    Palpitations     Papanicolaou smear of cervix with low grade squamous intraepithelial lesion (LGSIL) 2016    PTSD (post-traumatic stress disorder)     [place] survivor    Shortness of breath     Syncope       Past Surgical History:   Procedure Laterality Date    APPENDECTOMY        Allergies   Allergen Reactions    Doxycycline Unknown    Sertraline Other (See Comments)     Suicidal ideation    Wellbutrin [Bupropion] Other (See Comments)     seizure      Social History     Tobacco Use    Smoking status: Every Day     Current packs/day: 1.00     Average packs/day: 1 pack/day for 17.0 years (17.0 ttl pk-yrs)     Types: Cigarettes     Passive exposure: Past    Smokeless tobacco: Not on file   Substance Use Topics    Alcohol use: Yes      Wt Readings from Last 1 Encounters:   23 60.3 kg (132 lb 14.4 oz)        Anesthesia Evaluation   Pt has had prior anesthetic.     No history of anesthetic complications       ROS/MED HX  ENT/Pulmonary:    (-) tobacco use and sleep apnea   Neurologic:    (-) no seizures and no CVA   Cardiovascular:     (+)  - -   -  - -           INFANTE.  fainting (syncope).           dysrhythmias (PSVT),           (-) hypertension   METS/Exercise Tolerance:     Hematologic:       Musculoskeletal:       GI/Hepatic:    (-) GERD and liver disease   Renal/Genitourinary:    (-) renal disease   Endo:    (-) Type II DM and thyroid disease   Psychiatric/Substance Use:     (+) psychiatric history anxiety and depression (PTSD)  H/O chronic opiod use .     Infectious Disease:       Malignancy:       Other:            Physical Exam    Airway      "   Mallampati: II   TM distance: > 3 FB   Neck ROM: full   Mouth opening: > 3 cm    Respiratory Devices and Support         Dental       (+) Minor Abnormalities - some fillings, tiny chips      Cardiovascular   cardiovascular exam normal          Pulmonary   pulmonary exam normal                OUTSIDE LABS:  CBC:   Lab Results   Component Value Date    WBC 5.4 04/23/2025    WBC 6.1 04/22/2025    HGB 8.1 (L) 04/24/2025    HGB 8.3 (L) 04/23/2025    HCT 28.6 (L) 04/23/2025    HCT 31.1 (L) 04/22/2025     04/23/2025     (H) 04/22/2025     BMP:   Lab Results   Component Value Date     04/24/2025     04/23/2025    POTASSIUM 4.0 04/24/2025    POTASSIUM 4.2 04/23/2025    CHLORIDE 104 04/24/2025    CHLORIDE 111 (H) 04/23/2025    CO2 20 (L) 04/24/2025    CO2 22 04/23/2025    BUN 5.3 (L) 04/24/2025    BUN 8.2 04/23/2025    CR 0.72 04/24/2025    CR 0.82 04/23/2025    GLC 83 04/24/2025    GLC 97 04/23/2025     COAGS: No results found for: \"PTT\", \"INR\", \"FIBR\"  POC:   Lab Results   Component Value Date    HCGS Negative 04/22/2025     HEPATIC:   Lab Results   Component Value Date    ALBUMIN 4.5 04/22/2025    PROTTOTAL 7.2 04/22/2025    ALT 16 04/22/2025    AST 16 04/22/2025    ALKPHOS 51 04/22/2025    BILITOTAL 0.6 04/22/2025     OTHER:   Lab Results   Component Value Date    LUCIO 8.7 (L) 04/24/2025    LIPASE 26 04/22/2025    TSH 1.74 04/23/2025    SED 10 04/22/2025       Anesthesia Plan    ASA Status:  2    NPO Status:  NPO Appropriate    Anesthesia Type: General.     - Airway: ETT   Induction: Intravenous.   Maintenance: Balanced.        Consents    Anesthesia Plan(s) and associated risks, benefits, and realistic alternatives discussed. Questions answered and patient/representative(s) expressed understanding.     - Discussed:     - Discussed with:  Patient            Postoperative Care    Pain management: Multi-modal analgesia.   PONV prophylaxis: Ondansetron (or other 5HT-3), Background Propofol " Infusion     Comments:               Delvin Daniels MD    Clinically Significant Risk Factors Present on Admission          # Hyperchloremia: Highest Cl = 111 mmol/L in last 2 days, will monitor as appropriate      # Hypocalcemia: Lowest Ca = 8.3 mg/dL in last 2 days, will monitor and replace as appropriate              # Anemia: based on hgb <11  # Anemia: based on hgb <11

## 2025-04-25 ENCOUNTER — APPOINTMENT (OUTPATIENT)
Dept: ULTRASOUND IMAGING | Facility: CLINIC | Age: 30
End: 2025-04-25
Attending: NURSE PRACTITIONER
Payer: COMMERCIAL

## 2025-04-25 VITALS
RESPIRATION RATE: 16 BRPM | HEART RATE: 69 BPM | SYSTOLIC BLOOD PRESSURE: 120 MMHG | DIASTOLIC BLOOD PRESSURE: 72 MMHG | OXYGEN SATURATION: 99 % | TEMPERATURE: 99.1 F

## 2025-04-25 VITALS
HEART RATE: 80 BPM | RESPIRATION RATE: 15 BRPM | TEMPERATURE: 98.9 F | SYSTOLIC BLOOD PRESSURE: 112 MMHG | OXYGEN SATURATION: 98 % | DIASTOLIC BLOOD PRESSURE: 68 MMHG

## 2025-04-25 LAB
ALBUMIN SERPL BCG-MCNC: 4.1 G/DL (ref 3.5–5.2)
ALP SERPL-CCNC: 45 U/L (ref 40–150)
ALT SERPL W P-5'-P-CCNC: 18 U/L (ref 0–50)
ANION GAP SERPL CALCULATED.3IONS-SCNC: 12 MMOL/L (ref 7–15)
AST SERPL W P-5'-P-CCNC: 20 U/L (ref 0–45)
BILIRUB DIRECT SERPL-MCNC: 0.09 MG/DL (ref 0–0.3)
BILIRUB SERPL-MCNC: 0.3 MG/DL
BUN SERPL-MCNC: 8.2 MG/DL (ref 6–20)
CALCIUM SERPL-MCNC: 9 MG/DL (ref 8.8–10.4)
CALPROTECTIN STL-MCNT: 47.8 MG/KG (ref 0–49.9)
CHLORIDE SERPL-SCNC: 102 MMOL/L (ref 98–107)
CREAT SERPL-MCNC: 0.74 MG/DL (ref 0.51–0.95)
EGFRCR SERPLBLD CKD-EPI 2021: >90 ML/MIN/1.73M2
GLUCOSE SERPL-MCNC: 104 MG/DL (ref 70–99)
HCO3 SERPL-SCNC: 23 MMOL/L (ref 22–29)
HGB BLD-MCNC: 8.5 G/DL (ref 11.7–15.7)
PATH REPORT.COMMENTS IMP SPEC: NORMAL
PATH REPORT.FINAL DX SPEC: NORMAL
PATH REPORT.GROSS SPEC: NORMAL
PATH REPORT.MICROSCOPIC SPEC OTHER STN: NORMAL
PATH REPORT.RELEVANT HX SPEC: NORMAL
PHOTO IMAGE: NORMAL
POTASSIUM SERPL-SCNC: 3.8 MMOL/L (ref 3.4–5.3)
PROT SERPL-MCNC: 6.5 G/DL (ref 6.4–8.3)
SODIUM SERPL-SCNC: 137 MMOL/L (ref 135–145)
WBC # BLD AUTO: 6.4 10E3/UL (ref 4–11)

## 2025-04-25 PROCEDURE — 76705 ECHO EXAM OF ABDOMEN: CPT

## 2025-04-25 PROCEDURE — 85018 HEMOGLOBIN: CPT | Performed by: HOSPITALIST

## 2025-04-25 PROCEDURE — 250N000013 HC RX MED GY IP 250 OP 250 PS 637: Performed by: HOSPITALIST

## 2025-04-25 PROCEDURE — 250N000013 HC RX MED GY IP 250 OP 250 PS 637: Performed by: INTERNAL MEDICINE

## 2025-04-25 PROCEDURE — 250N000011 HC RX IP 250 OP 636: Performed by: INTERNAL MEDICINE

## 2025-04-25 PROCEDURE — 85048 AUTOMATED LEUKOCYTE COUNT: CPT | Performed by: NURSE PRACTITIONER

## 2025-04-25 PROCEDURE — 84075 ASSAY ALKALINE PHOSPHATASE: CPT | Performed by: NURSE PRACTITIONER

## 2025-04-25 PROCEDURE — 36415 COLL VENOUS BLD VENIPUNCTURE: CPT | Performed by: HOSPITALIST

## 2025-04-25 PROCEDURE — 99232 SBSQ HOSP IP/OBS MODERATE 35: CPT | Performed by: HOSPITALIST

## 2025-04-25 PROCEDURE — 82310 ASSAY OF CALCIUM: CPT | Performed by: HOSPITALIST

## 2025-04-25 RX ORDER — ONDANSETRON 4 MG/1
4 TABLET, ORALLY DISINTEGRATING ORAL EVERY 8 HOURS PRN
Qty: 10 TABLET | Refills: 0 | Status: SHIPPED | OUTPATIENT
Start: 2025-04-25 | End: 2025-04-25

## 2025-04-25 RX ORDER — PANTOPRAZOLE SODIUM 40 MG/1
40 TABLET, DELAYED RELEASE ORAL DAILY
Qty: 14 TABLET | Refills: 0 | Status: SHIPPED | OUTPATIENT
Start: 2025-04-25 | End: 2025-04-25

## 2025-04-25 RX ORDER — DICYCLOMINE HCL 20 MG
20 TABLET ORAL 4 TIMES DAILY PRN
Status: DISCONTINUED | OUTPATIENT
Start: 2025-04-25 | End: 2025-04-25 | Stop reason: HOSPADM

## 2025-04-25 RX ORDER — FERROUS GLUCONATE 324(38)MG
324 TABLET ORAL
Qty: 30 TABLET | Refills: 0 | Status: SHIPPED | OUTPATIENT
Start: 2025-04-25 | End: 2025-04-25

## 2025-04-25 RX ORDER — PANTOPRAZOLE SODIUM 40 MG/1
40 TABLET, DELAYED RELEASE ORAL DAILY
Qty: 14 TABLET | Refills: 0 | Status: SHIPPED | OUTPATIENT
Start: 2025-04-25

## 2025-04-25 RX ORDER — ACETAMINOPHEN 325 MG/1
650 TABLET ORAL EVERY 6 HOURS PRN
Qty: 30 TABLET | Refills: 0 | Status: SHIPPED | OUTPATIENT
Start: 2025-04-25

## 2025-04-25 RX ORDER — ACETAMINOPHEN 325 MG/1
650 TABLET ORAL EVERY 6 HOURS PRN
COMMUNITY
Start: 2025-04-25 | End: 2025-04-25

## 2025-04-25 RX ORDER — ONDANSETRON 4 MG/1
4 TABLET, ORALLY DISINTEGRATING ORAL EVERY 8 HOURS PRN
Qty: 10 TABLET | Refills: 0 | Status: SHIPPED | OUTPATIENT
Start: 2025-04-25

## 2025-04-25 RX ORDER — FERROUS GLUCONATE 324(38)MG
324 TABLET ORAL
Qty: 30 TABLET | Refills: 0 | Status: SHIPPED | OUTPATIENT
Start: 2025-04-25

## 2025-04-25 RX ADMIN — SIMETHICONE 40 MG: 20 SOLUTION/ DROPS ORAL at 10:57

## 2025-04-25 RX ADMIN — OXYCODONE HYDROCHLORIDE 2.5 MG: 5 TABLET ORAL at 09:29

## 2025-04-25 RX ADMIN — ONDANSETRON 4 MG: 4 TABLET, ORALLY DISINTEGRATING ORAL at 10:57

## 2025-04-25 RX ADMIN — PANTOPRAZOLE SODIUM 40 MG: 40 TABLET, DELAYED RELEASE ORAL at 07:06

## 2025-04-25 RX ADMIN — PANTOPRAZOLE SODIUM 40 MG: 40 TABLET, DELAYED RELEASE ORAL at 16:18

## 2025-04-25 ASSESSMENT — ACTIVITIES OF DAILY LIVING (ADL)
ADLS_ACUITY_SCORE: 22

## 2025-04-25 NOTE — PLAN OF CARE
Neuro:A&O x4  Tele/cardiac: N/A  Respiration: RA  Activity: ind  Pain: did not need pain meds this shift  Drips: PIV SL  Drains/tubes: none  Skin: intact  GI/: continent, tolerating diet  Aggression color: green  Isolation: none  Plan: US completed, possible discharge tonight

## 2025-04-25 NOTE — PLAN OF CARE
Goal Outcome Evaluation:      Plan of Care Reviewed With: patient, spouse               Patient alert and oriented x 4.  Denies shortness of breath, chest pain, nausea or vomiting.  After breakfast endorsed nausea and upper abdominal pain. Zofran and gas x administered.  provider updated labs and imaging ordered.  Patient n.p.o. for 530 abdominal ultrasound.  Please call ultrasound to coordinate with a more updated earlier, please see earlier note.

## 2025-04-25 NOTE — PLAN OF CARE
Goal Outcome Evaluation:      Plan of Care Reviewed With: patient    Overall Patient Progress: improvingOverall Patient Progress: improving    Shift: 7p-7a  Surgery/POD#: Pt here for diarrhea, unexplained weight loss, bloody stools.  Behavior & Aggression: Green  Fall Risk: No  Orientation: A&O x4  ABNL VS/O2: VSS on RA  Tele: NA  ABNL Labs: Hgb 8.1 - recheck this AM  Pain Management: PRN oxy x1  Bowel/Bladder: voiding in BR, bowel sounds active, passing flatus, last BM 4/24  Drains: NA  Lines: PIV SL'd  Diet: Regular  Activity Level: Ind  Number of times OUT OF BED this shift: Ind - up frequently  Tests/Procedures: NA  Anticipated  DC Date: pending  Significant Information:   Hospitalist & GI following. Encouraged PO intake.

## 2025-04-25 NOTE — PROGRESS NOTES
[facility]  Hospitalist Progress Note   04/25/2025          Assessment and Plan:       Sharon Barrera is a 30 year old female with medical history of PVCs, depression, PTSD, anxiety, psoriatic arthritis admitted on 4/22/2025 for diarrhea, blood in stools, weight loss.     Norovirus infection -diarrhea with intermittent rectal bleeding.  Acute on chronic anemia  Unintentional weight loss.  Focal active colitis on pathology ( colon biopsies)  -Patient presented to the hospital with chronic symptoms and subacute flare with diarrhea, 6-7 loose watery stools with intermittent blood.  Reports progressive fatigue, lightheadedness, dizziness ongoing.  Reported unintentional weight loss of more than 25 pounds over the last 5 weeks. Seen in [facility] clinic on 4/17. Had negative serum test for celiac disease.   *Most recent hgb from 7/15/24 was 12.  Hemoglobin 9.4 on admission.  Creatinine 0.70.  Liver enzymes within normal limits.  Lipase 26.  ESR 10.  CRP less than 3.  hCG serum qualitative negative.  *CT GI bleed- . No active bleeding into the gastrointestinal system.   *Norovirus positive on 4/23.  C. difficile negative  *EGD 4/24 normal stomach, normal duodenum, normal esophagus.  *Colonoscopy 4/24 examined portion of the ileum was normal, entire examined colon normal.  Biopsied.  No evidence of ulcerative colitis/ileitis/obvious Crohn's.  Gastric biopsy normal negative H. pylori.    Duodenal biopsies were normal no evidence of celiac disease.  Colon biopsies showed focal active colitis.  -- 4/25 patient continues to complain of abdominal pain -reports an epigastric area/right upper quadrant.  Tolerated breakfast this morning.  Reports having small bowel movements.  No blood.  --[facility] following, plan for right upper quadrant ultrasound in the hospital, outpatient [facility] for further evaluation of JUDY.  --Patient scheduled for ultrasound right upper quadrant later this evening.  Patient  would like to discharge if ultrasound results are normal.  Have initiated discharge paperwork.    ---- Will continue symptomatic treatment for norovirus.  Adequate oral hydration, low fiber diet until symptoms improve.  Small frequent meals.  Continue oral PPI.  Monitor hemoglobin levels in a.m. if stays overnight or in 1 week or earlier if symptomatic.  Continue enteric precautions  Follow-up with [facility] in clinic in 2 to 3 weeks.  Emphasized need to consider abstinence from nicotine use, marijuana use.  Consider stress reduction.     Severe iron deficiency. Microcytosis.  Iron saturation index 5.  Patient had received IV Venofer on 4/23, reports unable to tolerate IV iron.  Oral iron supplements on discharge.  Age-appropriate health maintenance, can consider hematology evaluation as outpatient    Presyncope likely from diarrhea.  History of PVCs  Patient complained of dizziness on admission.  Address medical issues as discussed.  EKG sinus rhythm.  Telemetry monitoring-no report of acute events.    TSH, magnesium, potassium within normal limits.  Fall precautions.  Encourage ambulation.     Uterine fibroid  CT of the abdomen showed fluid in the endometrial canal with an 18 mm x 15 mm x 10 mm soft tissue focus along the endometrium.  -US pelvis transabdominal CT finding corresponds to a 2.3 cm submucosal fibroid in the fundus with broad indentation of the endometrium. This may be the same lesion described on pelvic ultrasound report 7/5/2023, although the images are not available for direct comparison. Normal endometrial thickness. Normal ovaries.  GYN evaluation as outpatient    Historical seizure disorder.  Per chart review seizure at the age of 20, possibly related to stress.  --per review frequent headaches, advised to undergo CT last year but declined due to anxiety.   -- No focal neurological deficit     PTSD  Depression/anxiety  Previously on methadone maintenance therapy has been off  over 5 years  -Currently not on any medication    Nicotine dependence.  Vapes nicotine, emphasized need to consider abstinence.    Marijuana use.  Reports has medical marijuana for PTSD.  If ongoing symptoms consider alternatives for PTSD treatment.    Clinically Significant Risk Factors Present on Admission     # Anemia: based on hgb <11  # Anemia: based on hgb <11  Orders Placed This Encounter      Regular Diet Adult      DVT Prophylaxis: SCDs, ambulate  Code Status: Full Code  Disposition: Expected discharge likely later today versus tomorrow    Medically Ready for Discharge: Anticipated Tomorrow     Discussed with patient, her  by the bedside, bedside RN  Total time greater than 35 minutes more than 70% of time spent in direct patient care, care coordination, patient counseling, and formalizing plan of care.      Opal Mitchell MD        Interval History:      Patient lying in bed.  Reports abdominal pain, right upper quadrant, epigastric.  Diarrhea improving.  Reports generalized weakness  Afebrile.  Denies any chest pain.    No shortness of breath at rest.  Denies any new tingling or numbness.         Physical Exam:        Physical Exam   Temp:  [98.1  F (36.7  C)-99.2  F (37.3  C)] 98.1  F (36.7  C)  Pulse:  [] 62  Resp:  [14-18] 16  BP: ()/(51-70) 107/54  SpO2:  [97 %-100 %] 97 %    Intake/Output Summary (Last 24 hours) at 4/24/2025 1456  Last data filed at 4/24/2025 1047  Gross per 24 hour   Intake 1120 ml   Output 600 ml   Net 520 ml     PHYSICAL EXAM  GENERAL: Patient is in no distress. Alert and oriented.  HEART: Regular rate and rhythm. S1S2. No murmurs  LUNGS:Respirations unlabored  ABDOMEN: Soft, epigastric/right upper quadrant tenderness, bowel sounds heard.  NEURO moving all extremities  EXTREMITIES: No pedal edema.  SKIN: Warm, dry. No rash   PSYCHIATRY Cooperative       Medications:        Current Facility-Administered Medications   Medication Dose Route Frequency Provider  Last Rate Last Admin    pantoprazole (PROTONIX) EC tablet 40 mg  40 mg Oral BID AC Opal Mitchell MD   40 mg at 04/25/25 0706    sodium chloride (PF) 0.9% PF flush 3 mL  3 mL Intracatheter Q8H Cosme Taylor MD   3 mL at 04/24/25 2140     Current Facility-Administered Medications   Medication Dose Route Frequency Provider Last Rate Last Admin    acetaminophen (TYLENOL) tablet 650 mg  650 mg Oral Q4H PRN Cosme Taylor MD        Or    acetaminophen (TYLENOL) Suppository 650 mg  650 mg Rectal Q4H PRN Cosme Taylor MD        lidocaine (LMX4) cream   Topical Q1H PRN Cosme Taylor MD        lidocaine 1 % 0.1-1 mL  0.1-1 mL Other Q1H PRN Cosme Taylor MD        naloxone (NARCAN) injection 0.2 mg  0.2 mg Intravenous Q2 Min PRN Cosme Taylor MD        Or    naloxone (NARCAN) injection 0.4 mg  0.4 mg Intravenous Q2 Min PRN Cosme Taylor MD        Or    naloxone (NARCAN) injection 0.2 mg  0.2 mg Intramuscular Q2 Min PRN Cosme Taylor MD        Or    naloxone (NARCAN) injection 0.4 mg  0.4 mg Intramuscular Q2 Min PRN Cosme Taylor MD        ondansetron (ZOFRAN ODT) ODT tab 4 mg  4 mg Oral Q6H PRN Cosme Taylor MD   4 mg at 04/25/25 1057    Or    ondansetron (ZOFRAN) injection 4 mg  4 mg Intravenous Q6H PRN Cosme Taylor MD        oxyCODONE (ROXICODONE) tablet 5 mg  5 mg Oral Q6H PRN Cosme Taylor MD   5 mg at 04/24/25 2140    oxyCODONE IR (ROXICODONE) half-tab 2.5 mg  2.5 mg Oral Q6H PRN Cosme Taylor MD   2.5 mg at 04/25/25 0929    prochlorperazine (COMPAZINE) injection 10 mg  10 mg Intravenous Q6H PRCosme Clifford MD   10 mg at 04/23/25 2223    Or    prochlorperazine (COMPAZINE) tablet 10 mg  10 mg Oral Q6H PRCosme Clifford MD   10 mg at 04/23/25 1614    senna-docusate (SENOKOT-S/PERICOLACE) 8.6-50 MG per tablet 1 tablet  1 tablet Oral BID Cosme Britt MD        Or    sencastillo-natalite  (SENOKOT-S/PERICOLACE) 8.6-50 MG per tablet 2 tablet  2 tablet Oral BID PRN Cosme Taylor MD        simethicone (MYLICON) suspension 40 mg  40 mg Oral Q6H PRN Cosme Taylor MD   40 mg at 04/25/25 1057    sodium chloride (PF) 0.9% PF flush 3 mL  3 mL Intracatheter q1 min prn Cosme Taylor MD                Data:      All new lab and imaging data was reviewed.

## 2025-04-25 NOTE — PROGRESS NOTES
"Updated patient that she can get US at 530, following 8 HR NPO. patient stated she needs to leave tonight or by 7am tomorrow morning unless there is something \"serious\"and will follow up outpatient . Updated hospitalist and [facility]. Ultrasound updated to try get US as close to 530 due to possible for discharge tonight. Pt verified she has not eaten/drank since breakfast at 0930.    [facility] response- will have discharge planning/meds and follow up referrals placed. If labs/US normal no need to update [facility].     Hospitalist- spoke with patient and she is adamant about leaving tonight. Will update covering provider. Page oncall when results are back for the discharge order.   "

## 2025-04-25 NOTE — DISCHARGE SUMMARY
Discharge Summary  Hospitalist    Date of Admission:  4/22/2025  Date of Discharge:  4/25/2025  8:12 PM  Discharging Provider: Opal Mitchell MD    Primary Care Physician   [facility]  Primary Care Provider Phone Number: [phone]  Primary Care Provider Fax Number: [phone]    PRINCIPAL DIAGNOSIS  <principal problem not specified>    Past Medical History:   Diagnosis Date    Anxiety     Depressive disorder     post partum    Nondependent opioid abuse, continuous (H) 08/08/2016    Palpitations     Papanicolaou smear of cervix with low grade squamous intraepithelial lesion (LGSIL) 12/14/2016    PTSD (post-traumatic stress disorder)     [place] survivor    Shortness of breath     Syncope        History of Present Illness   Sharon Barrera is an 30 year old female who presented with abdominal pain.    Hospital Course   Sharon Barrera is a 30 year old female with medical history of PVCs, depression, PTSD, anxiety, psoriatic arthritis admitted on 4/22/2025 for diarrhea, blood in stools, weight loss.     Norovirus infection -diarrhea with intermittent rectal bleeding.  Acute on chronic anemia  Unintentional weight loss.  Focal active colitis on pathology ( colon biopsies)  -Patient presented to the hospital with chronic symptoms and subacute flare with diarrhea, 6-7 loose watery stools with intermittent blood.  Reports progressive fatigue, lightheadedness, dizziness ongoing.  Reported unintentional weight loss of more than 25 pounds over the last 5 weeks. Seen in [facility] clinic on 4/17. Had negative serum test for celiac disease.   *Most recent hgb from 7/15/24 was 12.  Hemoglobin 9.4 on admission.  Creatinine 0.70.  Liver enzymes within normal limits.  Lipase 26.  ESR 10.  CRP less than 3.  hCG serum qualitative negative.  *CT GI bleed- . No active bleeding into the gastrointestinal system.   *Norovirus positive on 4/23.  C. difficile negative  *EGD 4/24 normal stomach, normal duodenum,  normal esophagus.  *Colonoscopy 4/24 examined portion of the ileum was normal, entire examined colon normal.  Biopsied.  No evidence of ulcerative colitis/ileitis/obvious Crohn's.  Gastric biopsy normal negative H. pylori.    Duodenal biopsies were normal no evidence of celiac disease.  Colon biopsies showed focal active colitis.  -- 4/25 patient continues to complain of abdominal pain -reports an epigastric area/right upper quadrant.  Tolerated breakfast this morning.  Reports having small bowel movements.  No blood.  --[facility] following, plan for right upper quadrant ultrasound in the hospital, outpatient [facility] for further evaluation of JUDY.  --Patient scheduled for ultrasound right upper quadrant later this evening.  Patient would like to discharge if ultrasound results are normal.  Have completed patient's discharge paperwork, prescription sent over to pharmacy this afternoon.  Did discuss with bedside nurse regarding discharge plan.   --Recommended to continue continue symptomatic treatment for norovirus.  Continue oral PPI.  Emphasized need to consider abstinence from nicotine use, marijuana use.  Adequate oral hydration, low fiber diet until symptoms improve.  Consider stress reduction.  Follow-up with PCP in 1 week.  Monitor hemoglobin levels in 1 week or earlier if symptomatic.  Follow-up with [facility] in clinic in 2 to 3 weeks.   Will defer further unintentional weight loss workup to PCP.     Severe iron deficiency. Microcytosis.  Iron saturation index 5.  Patient had received IV Venofer on 4/23, reports unable to tolerate IV iron.  Oral iron supplements on discharge.   Age-appropriate health maintenance, can consider hematology evaluation as outpatient     Presyncope likely from diarrhea.  History of PVCs  Patient complained of dizziness on admission.  Addressed medical issues as discussed.  EKG sinus rhythm.  Telemetry monitoring-no report of acute events.    TSH,  magnesium, potassium within normal limits.  Fall precautions.  Encourage ambulation.     Uterine fibroid  CT of the abdomen showed fluid in the endometrial canal with an 18 mm x 15 mm x 10 mm soft tissue focus along the endometrium.  -US pelvis transabdominal CT finding corresponds to a 2.3 cm submucosal fibroid in the fundus with broad indentation of the endometrium. This may be the same lesion described on pelvic ultrasound report 7/5/2023, although the images are not available for direct comparison. Normal endometrial thickness. Normal ovaries.  GYN evaluation as outpatient     Historical seizure disorder.  Per chart review seizure at the age of 20, possibly related to stress.  --per review frequent headaches, advised to undergo CT last year but declined due to anxiety.   -- No focal neurological deficit     PTSD  Depression/anxiety  Previously on methadone maintenance therapy has been off over 5 years  -Currently not on any medication     Nicotine dependence.  Vapes nicotine, emphasized need to consider abstinence.     Marijuana use.  Reports has medical marijuana for PTSD.  If ongoing symptoms consider alternatives for PTSD treatment.    Opal Mitchell MD.    Pending Results   Unresulted Labs Ordered in the Past 30 Days of this Admission       No orders found from 3/23/2025 to 4/23/2025.               Physical Exam   There were no vitals filed for this visit.  Vital Signs with Ranges  Temp:  [98.1  F (36.7  C)-99.2  F (37.3  C)] 98.1  F (36.7  C)  Pulse:  [] 62  Resp:  [14-18] 16  BP: ()/(51-70) 107/54  SpO2:  [97 %-100 %] 97 %  I/O last 3 completed shifts:  In: 1243 [P.O.:240; I.V.:1003]  Out: -   PHYSICAL EXAM  GENERAL: Patient is in no distress. Alert and oriented.  HEART: Regular rate and rhythm. S1S2. No murmurs  LUNGS:Respirations unlabored  ABDOMEN: Soft, epigastric/right upper quadrant tenderness, bowel sounds heard.  NEURO moving all extremities  EXTREMITIES: No pedal edema.  SKIN: Warm,  dry. No rash   PSYCHIATRY Cooperative  )Consultations This Hospital Stay   GASTROENTEROLOGY IP CONSULT  CARE MANAGEMENT / SOCIAL WORK IP CONSULT    Time Spent on this Encounter   IOpal MD, personally saw the patient today.  Patient was discharged after hours by on-call team.    Discharge Disposition   Discharged to home    Discharge Orders      Reason for your hospital stay    You were admitted to the hospital with diarrhea, blood in the stools and weight loss.  Noted to have norovirus gastroenteritis, iron deficiency anemia.  Followed by hospitalist team, GI.  Plan for discharge with close follow-up.     Activity    Your activity upon discharge: activity as tolerated     Follow Up    Monitor hemoglobin level in 7 to 10 days or earlier if symptomatic.  Follow-up with [facility] in clinic in 2 to 3 weeks.   Will defer further unintentional weight loss workup to PCP.  Noted uterine fibroid (2.3 cm submucosal fibroid ) on imaging, recommend to consider GYN evaluation as outpatient.  If ongoing anemia consider hematology evaluation as outpatient.  Recommend age-appropriate health maintenance as outpatient.     Discharge Instructions    Small frequent meals.  Consider stress reduction.    consider abstinence from nicotine, marijuana use as able to.     Diet    Adequate oral hydration, low fiber diet until symptoms improve.     Hospital Follow-up with Existing Primary Care Provider (PCP)            Discharge Medications   Current Discharge Medication List        START taking these medications    Details   acetaminophen (TYLENOL) 325 MG tablet Take 2 tablets (650 mg) by mouth every 6 hours as needed for pain.    Associated Diagnoses: Generalized abdominal pain      ferrous gluconate (FERGON) 324 (38 Fe) MG tablet Take 1 tablet (324 mg) by mouth daily (with breakfast).  Qty: 30 tablet, Refills: 0    Associated Diagnoses: Iron deficiency anemia, unspecified iron deficiency anemia type       ondansetron (ZOFRAN ODT) 4 MG ODT tab Take 1 tablet (4 mg) by mouth every 8 hours as needed for nausea or vomiting.  Qty: 10 tablet, Refills: 0    Associated Diagnoses: Norovirus      pantoprazole (PROTONIX) 40 MG EC tablet Take 1 tablet (40 mg) by mouth daily.  Qty: 14 tablet, Refills: 0    Associated Diagnoses: Generalized abdominal pain           CONTINUE these medications which have NOT CHANGED    Details   clobetasol (TEMOVATE) 0.05 % external solution Apply topically every 48 hours      ketoconazole (NIZORAL) 2 % external shampoo Apply topically every 48 hours      valACYclovir (VALTREX) 500 MG tablet            STOP taking these medications       ibuprofen (ADVIL/MOTRIN) 800 MG tablet Comments:   Reason for Stopping:             Allergies   Allergies   Allergen Reactions    Doxycycline Unknown    Sertraline Other (See Comments)     Suicidal ideation    Wellbutrin [Bupropion] Other (See Comments)     seizure       DATA  Most Recent 3 CBC's:  Recent Labs   Lab Test 04/25/25  0732 04/24/25  0655 04/23/25  0712 04/22/25  1704 03/17/23  1349   WBC 6.4  --  5.4 6.1 6.1   HGB 8.5* 8.1* 8.3* 9.4* 14.0   MCV  --   --  77* 75* 91   PLT  --   --  377 470* 303      Most Recent 3 BMP's:  Recent Labs   Lab Test 04/25/25  0732 04/24/25  0655 04/23/25  0712    139 142   POTASSIUM 3.8 4.0 4.2   CHLORIDE 102 104 111*   CO2 23 20* 22   BUN 8.2 5.3* 8.2   CR 0.74 0.72 0.82   ANIONGAP 12 15 9   LUCIO 9.0 8.7* 8.3*   * 83 97     Most Recent 2 LFT's:  Recent Labs   Lab Test 04/25/25  0732 04/22/25  1704   AST 20 16   ALT 18 16   ALKPHOS 45 51   BILITOTAL 0.3 0.6     Most Recent TSH, T4 and A1c Labs:  Recent Labs   Lab Test 04/23/25  0712   TSH 1.74     Results for orders placed or performed during the hospital encounter of 04/22/25   CTA GI Bleed    Narrative    EXAM: CTA GI BLEED  LOCATION: [facility]  DATE: 4/22/2025    INDICATION: abdominal pain, bloody stool  COMPARISON: 10/25/2023 and  3/17/2023  TECHNIQUE: CT angiogram abdomen pelvis during arterial phase of injection of IV contrast. 2D and 3D MIP reconstructions were performed by the CT technologist. Dose reduction techniques were used.  CONTRAST: 82mL isovue 370    FINDINGS:  ANGIOGRAM ABDOMEN/PELVIS: No active bleeding into the gastrointestinal system.    LOWER CHEST: Normal.    HEPATOBILIARY: Normal.    PANCREAS: Normal.    SPLEEN: Normal.    ADRENAL GLANDS: Normal.    KIDNEYS/BLADDER: Normal.    BOWEL: Normal.    LYMPH NODES: Normal.    PELVIC ORGANS: There is fluid in the endometrial canal with an 18 mm x 15 mm x 10 mm soft tissue focus along the endometrium. There is trace free fluid in the pelvis.    MUSCULOSKELETAL: Normal.      Impression    IMPRESSION:  1.  No active bleeding into the gastrointestinal system.  2.  Fluid in the endometrial canal with an 18 mm x 15 mm x 10 mm soft tissue focus along the endometrium. Recommend further evaluation, perhaps with pelvic ultrasound, sonohysterogram, and/or MRI.     US Pelvic Complete w Transvaginal    Narrative    EXAM: US PELVIC TRANSABDOMINAL AND TRANSVAGINAL  LOCATION: [facility]  DATE: 4/22/2025    INDICATION: Pelvic pain, endometrial abnormality seen on CT  COMPARISON: CT 4/22/2025, ultrasound 3/17/2023  TECHNIQUE: Transabdominal scans were performed. Endovaginal ultrasound was performed to better visualize the adnexa.    FINDINGS:    UTERUS: 9.5 x 5.8 x 4.3 cm. 2.3 x 1.8 x 1.7 cm hypoechoic fibroid in the fundus anteriorly with broad indentation of the endometrium.    ENDOMETRIUM: 10 mm. Normal smooth endometrium.    RIGHT OVARY: 2.2 x 3.8 x 2.0 cm. Normal.    LEFT OVARY: 3.0 x 2.9 x 1.9 cm. Normal.    Mild amount of free fluid.      Impression    IMPRESSION:  1.  CT finding corresponds to a 2.3 cm submucosal fibroid in the fundus with broad indentation of the endometrium. This may be the same lesion described on pelvic ultrasound report 7/5/2023, although the  images are not available for direct comparison.  2.  Normal endometrial thickness.  3.  Normal ovaries.         US Abdomen Limited    Narrative    EXAM: US ABDOMEN LIMITED  LOCATION: [facility]  DATE: 4/25/2025    INDICATION: RUQ pain, diarrhea  COMPARISON: CT 3/17/2023  TECHNIQUE: Limited abdominal ultrasound.    FINDINGS:    GALLBLADDER: Normal. No gallstones, wall thickening, or pericholecystic fluid. Negative sonographic Stallings's sign.    BILE DUCTS: No biliary dilatation. The common duct measures 2 mm.    LIVER: Normal parenchyma with smooth contour. No focal mass. The portal vein is patent with flow in the normal direction.    RIGHT KIDNEY: No hydronephrosis. Nothing concerning for stones.    PANCREAS: The visualized portions are normal.    No ascites.      Impression    IMPRESSION:  1.  Normal limited abdominal ultrasound.

## 2025-04-25 NOTE — PROGRESS NOTES
[facility] Progress Note     Interval History:  C/o worsening right upper pain after breakfast this morning - she had an egg and cheese bagel - 6/10 soreness. Took some zofran after. Two small brown stools today.     Physical Exam:    /54 (BP Location: Right arm)   Pulse 62   Temp 98.1  F (36.7  C) (Oral)   Resp 16   SpO2 97%     Constitutional: No acute distress  Cardiovascular: RRR, normal S1/S2  Respiratory: Effort normal, CTA bilaterally  Abdomen: Soft, BS hyperactive, upper abdominal pain >RUQ - Stallings's sign neg    Laboratory Data  Recent Labs   Lab Test 04/25/25  0732 04/24/25  0655 04/23/25  0712 04/22/25  1704 03/17/23  1349   WBC  --   --  5.4 6.1 6.1   HGB 8.5* 8.1* 8.3* 9.4* 14.0   MCV  --   --  77* 75* 91   PLT  --   --  377 470* 303     Recent Labs   Lab Test 04/25/25  0732 04/24/25  0655 04/23/25  0712    139 142   POTASSIUM 3.8 4.0 4.2   CHLORIDE 102 104 111*   CO2 23 20* 22   BUN 8.2 5.3* 8.2   CR 0.74 0.72 0.82   ANIONGAP 12 15 9   LUCIO 9.0 8.7* 8.3*     Recent Labs   Lab Test 04/22/25  1704 03/17/23  1409 03/17/23  1349   ALBUMIN 4.5  --  5.1   BILITOTAL 0.6  --  0.5   ALT 16  --  12   AST 16  --  16   ALKPHOS 51  --  54   PROTEIN  --  Negative  --    LIPASE 26  --  25       Imaging  EXAM: CTA GI BLEED  LOCATION: [facility]  DATE: 4/22/2025     INDICATION: abdominal pain, bloody stool  COMPARISON: 10/25/2023 and 3/17/2023  TECHNIQUE: CT angiogram abdomen pelvis during arterial phase of injection of IV contrast. 2D and 3D MIP reconstructions were performed by the CT technologist. Dose reduction techniques were used.  CONTRAST: 82mL isovue 370     FINDINGS:  ANGIOGRAM ABDOMEN/PELVIS: No active bleeding into the gastrointestinal system.  LOWER CHEST: Normal.  HEPATOBILIARY: Normal.  PANCREAS: Normal.  SPLEEN: Normal.  ADRENAL GLANDS: Normal.  KIDNEYS/BLADDER: Normal.  BOWEL: Normal.  LYMPH NODES: Normal.  PELVIC ORGANS: There is fluid in the endometrial canal with  an 18 mm x 15 mm x 10 mm soft tissue focus along the endometrium. There is trace free fluid in the pelvis.  MUSCULOSKELETAL: Normal.                                                                      IMPRESSION:  1.  No active bleeding into the gastrointestinal system.  2.  Fluid in the endometrial canal with an 18 mm x 15 mm x 10 mm soft tissue focus along the endometrium. Recommend further evaluation, perhaps with pelvic ultrasound, sonohysterogram, and/or MRI.    EXAM: US PELVIC TRANSABDOMINAL AND TRANSVAGINAL  LOCATION: [facility]  DATE: 4/22/2025  IMPRESSION:  1.  CT finding corresponds to a 2.3 cm submucosal fibroid in the fundus with broad indentation of the endometrium. This may be the same lesion described on pelvic ultrasound report 7/5/2023, although the images are not available for direct comparison.  2.  Normal endometrial thickness.  3.  Normal ovaries.     Endoscopic Workup  EGD 4/24/25  Findings:       Esophagogastric landmarks were identified: the Z-line was found at 36        cm, the upper extent of the gastric folds was found at 36 cm and the        site of hiatal narrowing was found at 38 cm from the incisors.        The esophagus was normal.        The entire examined stomach was normal. Biopsies were taken with a cold        forceps for histology.        The examined duodenum was normal. Biopsies were taken with a cold        forceps for histology.                                                                                    Impression:               - Esophagogastric landmarks identified.                             - Normal esophagus.                             - Normal stomach. Biopsied.                             - Normal examined duodenum. Biopsied.   Recommendation:           - [facility] patient to [facility] for [facility] care.                             - Clear liquid diet.                             - See colonoscopy report for additional recs.        Colonoscopy 4/24/25   Findings:       The perianal examination was normal.        The terminal ileum appeared normal.        Prominent appendix indentation noted at AO. (no correlating abnormality        on recent CT). The colon (entire examined portion) appeared normal.        Biopsies were taken with a cold forceps for histology.        Internal hemorrhoids noted on retroflexed view.                                                                                    Impression:               - The examined portion of the ileum was normal.                             - Prominent appendix indentation noted at AO. (no                             correlating abnormality on recent CT).                             - The entire examined colon is normal. Biopsied. No                             evidence of ulcerative colitis/ileitis/obvious                             Crohn's.   Recommendation:           - Repeat colonoscopy at age 43.                             - Clear liquid diet.                             - Symptomatic treatment for norovirus. Ok to                             discharge Once able to maintain hydration PO.                             - Iron infusions.                             - Will schedule [facility] clinic follow up in 2-3 weeks                             to review next steps. No clear explanation so far                             for wt loss/anemia/chronic symptoms. GI team will                             sign off. Pls call back as needed.                                                                   Assessment & Plan:  30 year old with chronic diarrhea and subacute flare of symptoms (nausea, abdominal pain, diarrhea, weakness, presyncope) with progressive weight loss and anemia. + Norovirus. Calprotectin normal. C diff negative. CTA showed no bleeding and no obvious enteritis/colitis. EGD 4/24 showed a normal exam. Colonoscopy 4/24 showed normal colon (biopsied).     Path: gastric bx  normal, negative for H pylori. Duodenal biopsies were normal - no evidence of celiac disease. Random colon biopsies showed focal active colitis (common with infectious process). Histologic features of microscopic colitis are not seen. Discussed path with patient.     Given right upper quadrant pain/tenderness today, will check abdominal ultrasound to evaluate for cholelithiasis, cholecystitis, etc.     Plan  -Check LFTs today   -RUQ US given RUQ pain/tenderness for reassurance that gallbladder is normal before discharge   -Recommend liquid diet/low fiber diet until feeing better  -Will order dicyclomine 20mg QID prn for abdominal pain/cramping   -Outpatient pillcam +[facility] for further evaluation of JUDY (hx of appendectomy) and outpatient follow up. We will place orders at [facility] and contact her to schedule these appointments   -Notified by RN that patient is hoping to leave tonight after the ultrasound or at the latest at 7AM tomorrow, as long as the US is normal. OK from our perspective to discharge if US is normal. We will sign off. Please call/reconsult as needed if US is abnormal.   -Discussed with Dr. Jennifer Nichols, [facility]  Cell: [phone]   Office: [phone]

## 2025-04-25 NOTE — PROVIDER NOTIFICATION
MD Notification    Notified Person: MD    Notified Person Name: Herson Covarrubias    Notification Date/Time: 4/24 @ 8:27pm    Notification Interaction: Vocera Message    Purpose of Notification: Pt is asking if she can have some IVF, she says she feels dehydrated. Encouraged PO intake. Please advise, thanks!    Orders Received: no IVF for now, encourage PO intake    Comments:

## 2025-04-25 NOTE — PROGRESS NOTES
"Pt requested [facility] be updated for her to leave today due to feeling \"good\" X upper ABD discomfort. Message left with [facility] to update [facility], after call pt complained of increased upper abd pain 7/10 and nausea following pt eating breakfast and drinking soda. Zofran and gas x administered. Will updated MD when they call back.   "

## 2025-04-26 ENCOUNTER — PATIENT OUTREACH (OUTPATIENT)
Dept: CARE COORDINATION | Facility: CLINIC | Age: 30
End: 2025-04-26
Payer: COMMERCIAL

## 2025-04-26 NOTE — DISCHARGE INSTRUCTIONS
Pt would like to  her medications from [facility] at [address], [place] [address]. Writer couldn't go over the discharge instruction/education, pt said she doesn't have time and wants to leave.

## 2025-04-26 NOTE — PROGRESS NOTES
[facility]: Transitions of Care Outreach  Chief Complaint   Patient presents with    Clinic Care Coordination - Post Hospital       Most Recent Admission Date: 4/22/2025   Most Recent Admission Diagnosis: Lower GI bleeding - K92.2  Generalized abdominal pain - R10.84     Most Recent Discharge Date: 4/25/2025   Most Recent Discharge Diagnosis: Lower GI bleeding - K92.2  Generalized abdominal pain - R10.84  Iron deficiency anemia, unspecified iron deficiency anemia type - D50.9  Norovirus - A08.11     Transitions of Care Assessment    Discharge Assessment  How are you doing now that you are home?: Okay.  How are your symptoms? (Red Flag symptoms escalate to triage hotline per guidelines): New (recovering)  Do you know how to contact your clinic care team if you have future questions or changes to your health status? : Yes  Does the patient have their discharge instructions? : Yes  Does the patient have questions regarding their discharge instructions? : No  Were you started on any new medications or were there changes to any of your previous medications? : Yes  Does the patient have all of their medications?: Yes  Do you have questions regarding any of your medications? : No  Do you have all of your needed medical supplies or equipment (DME)?  (i.e. oxygen tank, CPAP, cane, etc.): Yes    Post-op ([facility] CTA Only)  If the patient had a surgery or procedure, do they have any questions for a nurse?: No      Follow up Plan     Discharge Follow-Up  Discharge follow up appointment scheduled in alignment with recommended follow up timeframe or Transitions of Risk Category? (Low = within 30 days; Moderate= within 14 days; High= within 7 days): No (Patient will call GI clinic on Monday for follow up appt.)    No future appointments.    Outpatient Plan as outlined on AVS reviewed with patient.    For any urgent concerns, please contact our 24 hour nurse triage line: [phone] ([phone])       Maribell  Keaton, [facility]  [phone]

## 2025-04-26 NOTE — PROGRESS NOTES
Pt is eager to leave the hospital, was advised if she can wait for the discharge paperwork and education to go over but pt got agitated and said she can't wait anymore and said she is ready to leave. She doesn't want to wait to  prescribed meds from the pharmacy. She would like to pick it up from [facility]'s at [address], [place] [address].

## 2025-05-05 ENCOUNTER — APPOINTMENT (OUTPATIENT)
Age: 30
Setting detail: DERMATOLOGY
End: 2025-05-05

## 2025-05-05 VITALS — HEIGHT: 55 IN | WEIGHT: 115 LBS

## 2025-05-05 DIAGNOSIS — L21.8 OTHER SEBORRHEIC DERMATITIS: ICD-10-CM

## 2025-05-05 DIAGNOSIS — L40.0 PSORIASIS VULGARIS: ICD-10-CM

## 2025-05-05 PROCEDURE — OTHER PRESCRIPTION: OTHER

## 2025-05-05 PROCEDURE — OTHER MIPS QUALITY: OTHER

## 2025-05-05 PROCEDURE — OTHER PRESCRIPTION MEDICATION MANAGEMENT: OTHER

## 2025-05-05 PROCEDURE — OTHER REASON FOR TELEMEDICINE VISIT: OTHER

## 2025-05-05 PROCEDURE — OTHER PATIENT SPECIFIC COUNSELING: OTHER

## 2025-05-05 PROCEDURE — OTHER COUNSELING: OTHER

## 2025-05-05 PROCEDURE — OTHER CONSENT FOR TELEMEDICINE VISIT OBTAINED: OTHER

## 2025-05-05 PROCEDURE — 98006 SYNCH AUDIO-VIDEO EST MOD 30: CPT

## 2025-05-05 RX ORDER — KETOCONAZOLE 20 MG/ML
SHAMPOO, SUSPENSION TOPICAL
Qty: 120 | Refills: 3 | Status: ERX

## 2025-05-05 RX ORDER — ROFLUMILAST 3 MG/G
AEROSOL, FOAM TOPICAL
Qty: 60 | Refills: 1 | Status: ERX | COMMUNITY
Start: 2025-05-05

## 2025-05-05 RX ORDER — CLOBETASOL PROPIONATE 0.5 MG/ML
SOLUTION TOPICAL QHS
Qty: 50 | Refills: 1 | Status: ERX

## 2025-05-05 ASSESSMENT — LOCATION DETAILED DESCRIPTION DERM
LOCATION DETAILED: LEFT SUPERIOR PARIETAL SCALP
LOCATION DETAILED: RIGHT OCCIPITAL SCALP

## 2025-05-05 ASSESSMENT — LOCATION SIMPLE DESCRIPTION DERM
LOCATION SIMPLE: SCALP
LOCATION SIMPLE: POSTERIOR SCALP

## 2025-05-05 ASSESSMENT — LOCATION ZONE DERM: LOCATION ZONE: SCALP
